# Patient Record
Sex: FEMALE | Race: WHITE | URBAN - METROPOLITAN AREA
[De-identification: names, ages, dates, MRNs, and addresses within clinical notes are randomized per-mention and may not be internally consistent; named-entity substitution may affect disease eponyms.]

---

## 2017-01-17 ENCOUNTER — OFFICE VISIT (OUTPATIENT)
Dept: FAMILY MEDICINE CLINIC | Facility: CLINIC | Age: 52
End: 2017-01-17

## 2017-01-17 VITALS
TEMPERATURE: 97.6 F | HEART RATE: 80 BPM | SYSTOLIC BLOOD PRESSURE: 140 MMHG | RESPIRATION RATE: 16 BRPM | WEIGHT: 252 LBS | DIASTOLIC BLOOD PRESSURE: 88 MMHG | BODY MASS INDEX: 38.19 KG/M2 | OXYGEN SATURATION: 97 % | HEIGHT: 68 IN

## 2017-01-17 DIAGNOSIS — J01.01 ACUTE RECURRENT MAXILLARY SINUSITIS: Primary | ICD-10-CM

## 2017-01-17 DIAGNOSIS — J45.41 MODERATE PERSISTENT ASTHMA WITH ACUTE EXACERBATION: ICD-10-CM

## 2017-01-17 DIAGNOSIS — J30.9 CHRONIC ALLERGIC RHINITIS: ICD-10-CM

## 2017-01-17 RX ORDER — CEFUROXIME AXETIL 500 MG/1
500 TABLET ORAL 2 TIMES DAILY
Qty: 20 TAB | Refills: 0 | Status: SHIPPED | OUTPATIENT
Start: 2017-01-17 | End: 2017-04-10

## 2017-01-17 RX ORDER — HYDROCODONE POLISTIREX AND CHLORPHENIRAMINE POLISTIREX 10; 8 MG/5ML; MG/5ML
1 SUSPENSION, EXTENDED RELEASE ORAL
Qty: 115 ML | Refills: 0 | Status: SHIPPED | OUTPATIENT
Start: 2017-01-17 | End: 2017-04-10 | Stop reason: SDUPTHER

## 2017-01-17 RX ORDER — DEXAMETHASONE 1.5 MG/1
TABLET ORAL
Qty: 12 TAB | Refills: 0 | Status: SHIPPED | OUTPATIENT
Start: 2017-01-17 | End: 2017-04-10 | Stop reason: ALTCHOICE

## 2017-01-17 RX ORDER — FLUTICASONE PROPIONATE 50 MCG
2 SPRAY, SUSPENSION (ML) NASAL DAILY
Qty: 3 BOTTLE | Refills: 3 | Status: SHIPPED | OUTPATIENT
Start: 2017-01-17 | End: 2018-11-13 | Stop reason: SDUPTHER

## 2017-01-17 NOTE — PROGRESS NOTES
Opal Fried is a 49 y.o.  female presents today for same day visit for asthma. Pt is in Room # 5.      1. Have you been to the ER, urgent care clinic since your last visit? Hospitalized since your last visit? No    2. Have you seen or consulted any other health care providers outside of the [address] [place] [address] since your last visit? Include any pap smears or colon screening.  No

## 2017-01-17 NOTE — PROGRESS NOTES
SUBJECTIVE  Chief Complaint   Patient presents with    Asthma     HPI:     She has had bad cough with sinus congestion and pain on Rt side of face since 1 weeks ago, not getting better. She has scantily productive cough and dyspnea/ wheeze. She is on Advair and Singulair. Chest is getting sore from coughing. She has chronic rhinitis along with asthma; but run out of Flonase couple of weeks ago. Her BP is being treated. Past Medical History   Diagnosis Date    Asthma     Cerebral palsy (HCC)     Cervical strain     GERD (gastroesophageal reflux disease)     Hyperlipidemia     Hypertension     Ovarian cyst     Sinusitis      with cough     Past Surgical History   Procedure Laterality Date    Hx tubal ligation Bilateral     Hx hysterectomy      Hx cholecystectomy          Current Outpatient Prescriptions   Medication Sig    chlorpheniramine-HYDROcodone (TUSSIONEX) 10-8 mg/5 mL suspension Take 5 mL by mouth every twelve (12) hours as needed for Cough. Max Daily Amount: 10 mL.  triamterene-hydroCHLOROthiazide (DYAZIDE) 37.5-25 mg per capsule TAKE ONE CAPSULE BY MOUTH ONCE DAILY    albuterol (PROVENTIL HFA, VENTOLIN HFA, PROAIR HFA) 90 mcg/actuation inhaler Take 2 Puffs by inhalation every four (4) hours as needed for Wheezing.  EPINEPHrine (EPIPEN) 0.3 mg/0.3 mL injection 0.3 mL by IntraMUSCular route once as needed for up to 1 dose.  naproxen (NAPROSYN) 500 mg tablet Take 1 Tab by mouth two (2) times daily as needed.  fluticasone-salmeterol (ADVAIR DISKUS) 250-50 mcg/dose diskus inhaler Take 1 Puff by inhalation every twelve (12) hours.  montelukast (SINGULAIR) 10 mg tablet Take 1 Tab by mouth daily.  fluticasone (FLONASE) 50 mcg/actuation nasal spray 2 Sprays by Both Nostrils route daily.  guaiFENesin (MUCINEX) 1,200 mg Ta12 ER tablet Take 1 Tab by mouth two (2) times a day.  (Patient taking differently: Take 1,200 mg by mouth daily as needed.)    CETIRIZINE HCL (ZYRTEC PO) Take  by mouth. No current facility-administered medications for this visit. Allergies   Allergen Reactions    Latex Hives    Other Plant, Animal, Environmental Anaphylaxis     Wasp Sting    Prednisone Unable to Obtain     ROS:   Constitutional: No fever, chills, night sweats, dizziness. Ear/Nose/Throat: No new speaking or hearing problems, nose bleed. Cardiovascular: No angina, palpitations, PND, orthopnea, lightheadedness, edema, claudication. Respiratory: No pleurisy, hemoptysis, unusual sputum. Gastrointestinal: No nausea, bowel habit change, pain, TRINO symptoms, melena, hematochezia, anorexia. Psychiatric: No agitation, confusion/disorientation, suicidal or homicidal ideation. Musculoskeletal: no significant joint swelling, instability, focal weakness. OBJECTIVE  Constitutional: General Appearance:  well developed, obese, nontoxic, in no acute distress. Visit Vitals    /88 (BP 1 Location: Left arm, BP Patient Position: Sitting)    Pulse 80    Temp 97.6 °F (36.4 °C) (Oral)    Resp 16    Ht 5' 8\" (1.727 m)    Wt 252 lb (114.3 kg)    SpO2 97%    BMI 38.32 kg/m2     Otoscopic Examination: external auditory canals are clear; tympanic membranes are dull. Nasal Cavity: mildly swollen mucosa & turbinates. Maxilla and Rt side of face and neck is tender w/o redness or heat. Throat: clear tonsils, oropharynx, posterior pharynx. Cardiovascular[de-identified] Palpation: PMI not displaced or enlarged, no thrills or heaves. Auscultation: RRR; no murmur, rubs or gallops. Extremities: no edema, no active varicosity. Pulmonary[de-identified] Respiratory effort: no dyspnea, no retractions, no accessory muscle use. Auscultation: normal & symmetrical air exchange; no rales; diffuse mild rhonchi and wheeze; no rubs  Psychiatric: Oriented to time, place and person. Musculoskeletal: NC/AT. Neck-supple. CXR (5/9/16): Negative. ASSESSMENT    1. Acute recurrent maxillary sinusitis    2.  Moderate persistent asthma with acute exacerbation    3. Chronic allergic rhinitis        PLAN    Orders Placed This Encounter    cefUROXime (CEFTIN) 500 mg tablet    dexamethasone (DECADRON) 1.5 mg tablet    fluticasone (FLONASE) 50 mcg/actuation nasal spray    chlorpheniramine-HYDROcodone (TUSSIONEX) 10-8 mg/5 mL suspension     Pharmacologic Management: Medications reviewed with the patient. Ceftin 500 bid. Decadron 6 mg x 2d, 3 mg x 2d, 1.5 mg x4 days. Back on Flonase. PRN Tussionex prn. Non-Pharmacologic Management: Discussed risk/benefit & side effect of treatment. Discussed DDx, follow-up & work-up. Follow up for regular OV in 2 weeks, prn sooner. PRN to ER. Asthma/ allergy reviewed again. Health risk from non adherence discussed. Patent voiced understanding.                           Nahomy Farnsworth MD

## 2017-04-10 ENCOUNTER — TELEPHONE (OUTPATIENT)
Dept: FAMILY MEDICINE CLINIC | Facility: CLINIC | Age: 52
End: 2017-04-10

## 2017-04-10 ENCOUNTER — OFFICE VISIT (OUTPATIENT)
Dept: FAMILY MEDICINE CLINIC | Age: 52
End: 2017-04-10

## 2017-04-10 VITALS
HEIGHT: 67 IN | HEART RATE: 76 BPM | BODY MASS INDEX: 41.59 KG/M2 | SYSTOLIC BLOOD PRESSURE: 126 MMHG | RESPIRATION RATE: 20 BRPM | OXYGEN SATURATION: 96 % | DIASTOLIC BLOOD PRESSURE: 84 MMHG | WEIGHT: 265 LBS | TEMPERATURE: 97.5 F

## 2017-04-10 DIAGNOSIS — R05.9 COUGH: ICD-10-CM

## 2017-04-10 DIAGNOSIS — J45.41 MODERATE PERSISTENT ASTHMA WITH ACUTE EXACERBATION: Primary | ICD-10-CM

## 2017-04-10 RX ORDER — HYDROCODONE POLISTIREX AND CHLORPHENIRAMINE POLISTIREX 10; 8 MG/5ML; MG/5ML
1 SUSPENSION, EXTENDED RELEASE ORAL
Qty: 115 ML | Refills: 0 | Status: SHIPPED | OUTPATIENT
Start: 2017-04-10 | End: 2017-06-15 | Stop reason: SDUPTHER

## 2017-04-10 RX ORDER — GLUCOSAM/CHONDRO/HERB 149/HYAL 750-100 MG
TABLET ORAL
COMMUNITY

## 2017-04-10 RX ORDER — BISMUTH SUBSALICYLATE 262 MG
1 TABLET,CHEWABLE ORAL DAILY
COMMUNITY

## 2017-04-10 RX ORDER — ALBUTEROL SULFATE 90 UG/1
2 AEROSOL, METERED RESPIRATORY (INHALATION)
Qty: 1 INHALER | Refills: 0 | Status: SHIPPED | OUTPATIENT
Start: 2017-04-10 | End: 2017-08-11 | Stop reason: SDUPTHER

## 2017-04-10 NOTE — TELEPHONE ENCOUNTER
Received a call from the pt c/o cough and wheezing. Pt is advised to make a follow up appt. Pt acknowledges understanding. If symptoms worsens to go to ED for further evaluation.

## 2017-04-10 NOTE — PROGRESS NOTES
HISTORY OF PRESENT ILLNESS  Maya Solis is a 49 y.o. female. HPI Comments: Pt with significant asthma history presents with cough with post-tussive vomiting that started last night. Today the cough is accompanied by \"gasping for breath,\" and is productive of thick mucus. She denies any fever, but states she could have walking pneumonia and not have a fever. She has been having increased allergy/asthma symptoms for the past two weeks d/t the pollen being high. States she is taking her singulair, zyrtec (at night), advair (twice daily), and her albuterol inhaler (5 times this week). Cough   Associated symptoms include shortness of breath. Pertinent negatives include no chest pain, no abdominal pain and no headaches. Review of Systems   Constitutional: Positive for malaise/fatigue. Negative for chills and fever. HENT: Positive for congestion and sore throat (d/t cough). Negative for ear pain and tinnitus. Eyes: Negative for blurred vision and double vision. Respiratory: Positive for cough, sputum production, shortness of breath and wheezing. Cardiovascular: Negative for chest pain and palpitations. Gastrointestinal: Positive for vomiting (post-tussive). Negative for abdominal pain, constipation, diarrhea and nausea. Genitourinary: Negative for dysuria. Musculoskeletal: Positive for myalgias (musculoskeletal rib pain secondary to cough). Skin: Negative for rash. Neurological: Positive for dizziness (with cough episodes). Negative for tingling and headaches. Visit Vitals    /84 (BP 1 Location: Right arm, BP Patient Position: Sitting)    Pulse 76    Temp 97.5 °F (36.4 °C) (Oral)    Resp 20    Ht 5' 7\" (1.702 m)    Wt 265 lb (120.2 kg)    SpO2 96%    BMI 41.5 kg/m2       Physical Exam   Constitutional: She is oriented to person, place, and time. Vital signs are normal. She appears well-developed and well-nourished. She is cooperative. She does not appear ill.  No distress. HENT:   Head: Normocephalic and atraumatic. Right Ear: Tympanic membrane, external ear and ear canal normal.   Left Ear: Tympanic membrane, external ear and ear canal normal.   Nose: Mucosal edema (mild) and rhinorrhea (scant) present. Mouth/Throat: Uvula is midline, oropharynx is clear and moist and mucous membranes are normal.   Eyes: Conjunctivae are normal.   Neck: Neck supple. Cardiovascular: Normal rate, regular rhythm and normal heart sounds. Exam reveals no gallop and no friction rub. No murmur heard. Pulses:       Radial pulses are 2+ on the right side, and 2+ on the left side. Pulmonary/Chest: Effort normal and breath sounds normal. She has no decreased breath sounds. She has no wheezes. She has no rhonchi. She has no rales. There is a slight inspiratory and expiratory rub appreciated at the left upper lung field that seems to improve after one episode of coughing (pt coughs throughout the exam); but no elizabeth W/R/R in any of the lung fields. Lymphadenopathy:        Head (right side): No submental, no submandibular, no tonsillar, no preauricular, no posterior auricular and no occipital adenopathy present. Head (left side): No submental, no submandibular, no tonsillar, no preauricular, no posterior auricular and no occipital adenopathy present. She has no cervical adenopathy. Neurological: She is alert and oriented to person, place, and time. Skin: Skin is warm and dry. Psychiatric: She has a normal mood and affect. Her speech is normal and behavior is normal. Thought content normal.   Nursing note and vitals reviewed. ASSESSMENT and PLAN  Exam is reassuring. History is strongly suggestive of allergic/asthmatic etiology. ICD-10-CM ICD-9-CM    1. Moderate persistent asthma with acute exacerbation J45.41 493.92 albuterol (PROVENTIL HFA, VENTOLIN HFA, PROAIR HFA) 90 mcg/actuation inhaler   2.  Cough R05 786.2 chlorpheniramine-HYDROcodone (TUSSIONEX) 10-8 mg/5 mL suspension     1. Provided after-visit information on  Asthma attack. Reviewed reasons to return or seek emergency care. Provided additional information from Dr Rosemarie Golden and the [facility] about ways to address the root causes of asthma/inflammation. Refilled albuterol at pt's request. Discussed refill of pt's dexamethasone prescription; she prefers not to take any more steriods, and indicates that she can follow up with her PCP in a couple of days if needed. 2. Provided after-visit information on  Cough. Reviewed reasons to return or seek emergency care.  reviewed. No aberrant behavior noted. Pt verbalized understanding and agreement with the plan of care.     Nithya Ramirez PA-C

## 2017-04-10 NOTE — PATIENT INSTRUCTIONS
The Right Probiotics May Help Your Hay Fever  Good bugs may help to reduce seasonal-allergy symptoms, suggests a small clinical study. Were not talking about ants or crickets outside but about beneficial bacteria living inside your digestive system. Researchers divided a group of allergy sufferers, and half took a probiotic supplement of lactobacilli and bifidobacteria (brand name Kyo-Tamelas) while the other half took a placebo. Lavinia and behold, those who took the probiotic supplement during hay fever season reported having fewer symptoms. The digestive tract houses part of the immune system, and researchers suspect that a surge in those particular bacteria increase the bodys percentage of regulatory T-cells, which helps to keep symptoms in check. (The study focused on people with mild symptoms only.)     If allergies tend to sideline you this time of year, talk with your physician about trying probiotics. You may get to enjoy that spring fever after all. The Wellness Tip of the Week is from [facility] 360-5 Daily Wellness Tips. This study showed improvement in allergy symptoms with probiotics:  http://ajcn. nutrition. org/content/early/2017/02/22/ajcn. 170.067682    http://[employer]/blog/2013/09/17/breathe-easy-addressing-root-causes-asthma/      Inflammation is a natural part of the body's defenses and healing process. But excessive or chronic inflammation can lead to or worsen diseases such as heart disease, obesity, arthritis, and diabetes. Proper diet, exercise, sleep, and stress reduction are all important ways to keep inflammation in check.      Here are some great online articles for more information:  http://PeerApp/grow/how-inflammation-affects-health-body  http://[employer].netomat/13-anti-inflammatory-foods/  http://PeerApp/fitness/exercise-inflammation-060513    http://[employer]/blog/2012/01/27/inflammation-how-to-cool-the-fire-inside-you-thats-making-you-fat-and-diseased/         Asthma Attack: Care Instructions  Your Care Instructions    During an asthma attack, the airways swell and narrow. This makes it hard to breathe. Severe asthma attacks can be life-threatening, but you can help prevent them by keeping your asthma under control and treating symptoms before they get bad. Symptoms include being short of breath, having chest tightness, coughing, and wheezing. Noting and treating these symptoms can also help you avoid future trips to the emergency room. The doctor has checked you carefully, but problems can develop later. If you notice any problems or new symptoms, get medical treatment right away. Follow-up care is a key part of your treatment and safety. Be sure to make and go to all appointments, and call your doctor if you are having problems. It's also a good idea to know your test results and keep a list of the medicines you take. How can you care for yourself at home? · Follow your asthma action plan to prevent and treat attacks. If you don't have an asthma action plan, work with your doctor to create one. · Take your asthma medicines exactly as prescribed. Talk to your doctor right away if you have any questions about how to take them. ¨ Use your quick-relief medicine when you have symptoms of an attack. Quick-relief medicine is usually an albuterol inhaler. Some people need to use quick-relief medicine before they exercise. ¨ Take your controller medicine every day, not just when you have symptoms. Controller medicine is usually an inhaled corticosteroid. The goal is to prevent problems before they occur.  Don't use your controller medicine to treat an attack that has already started. It doesn't work fast enough to help. ¨ If your doctor prescribed corticosteroid pills to use during an attack, take them exactly as prescribed. It may take hours for the pills to work, but they may make the episode shorter and help you breathe better. ¨ Keep your quick-relief medicine with you at all times. · Talk to your doctor before using other medicines. Some medicines, such as aspirin, can cause asthma attacks in some people. · If you have a peak flow meter, use it to check how well you are breathing. This can help you predict when an asthma attack is going to occur. Then you can take medicine to prevent the asthma attack or make it less severe. · Do not smoke or allow others to smoke around you. Avoid smoky places. Smoking makes asthma worse. If you need help quitting, talk to your doctor about stop-smoking programs and medicines. These can increase your chances of quitting for good. · Learn what triggers an asthma attack for you, and avoid the triggers when you can. Common triggers include colds, smoke, air pollution, dust, pollen, mold, pets, cockroaches, stress, and cold air. · Avoid colds and the flu. Get a pneumococcal vaccine shot. If you have had one before, ask your doctor if you need a second dose. Get a flu vaccine every fall. If you must be around people with colds or the flu, wash your hands often. When should you call for help? Call 911 anytime you think you may need emergency care. For example, call if:  · You have severe trouble breathing. Call your doctor now or seek immediate medical care if:  · Your symptoms do not get better after you have followed your asthma action plan. · You have new or worse trouble breathing. · Your coughing and wheezing get worse. · You cough up dark brown or bloody mucus (sputum). · You have a new or higher fever.   Watch closely for changes in your health, and be sure to contact your doctor if:  · You need to use quick-relief medicine on more than 2 days a week (unless it is just for exercise). · You cough more deeply or more often, especially if you notice more mucus or a change in the color of your mucus. · You are not getting better as expected. Where can you learn more? Go to http://tavon-kareen.info/. Enter M578 in the search box to learn more about \"Asthma Attack: Care Instructions. \"  Current as of: May 23, 2016  Content Version: 11.2  © 4189-6715 [employer]. Care instructions adapted under license by [employer] (which disclaims liability or warranty for this information). If you have questions about a medical condition or this instruction, always ask your healthcare professional. [address] any warranty or liability for your use of this information. Cough: Care Instructions  Your Care Instructions  A cough is your body's response to something that bothers your throat or airways. Many things can cause a cough. You might cough because of a cold or the flu, bronchitis, or asthma. Smoking, postnasal drip, allergies, and stomach acid that backs up into your throat also can cause coughs. A cough is a symptom, not a disease. Most coughs stop when the cause, such as a cold, goes away. You can take a few steps at home to cough less and feel better. Follow-up care is a key part of your treatment and safety. Be sure to make and go to all appointments, and call your doctor if you are having problems. It's also a good idea to know your test results and keep a list of the medicines you take. How can you care for yourself at home? · Drink lots of water and other fluids. This helps thin the mucus and soothes a dry or sore throat. Honey or lemon juice in hot water or tea may ease a dry cough. · Take cough medicine as directed by your doctor. · Prop up your head on pillows to help you breathe and ease a dry cough. · Try cough drops to soothe a dry or sore throat. Cough drops don't stop a cough. Medicine-flavored cough drops are no better than candy-flavored drops or hard candy. · Do not smoke. Avoid secondhand smoke. If you need help quitting, talk to your doctor about stop-smoking programs and medicines. These can increase your chances of quitting for good. When should you call for help? Call 911 anytime you think you may need emergency care. For example, call if:  · You have severe trouble breathing. Call your doctor now or seek immediate medical care if:  · You cough up blood. · You have new or worse trouble breathing. · You have a new or higher fever. · You have a new rash. Watch closely for changes in your health, and be sure to contact your doctor if:  · You cough more deeply or more often, especially if you notice more mucus or a change in the color of your mucus. · You have new symptoms, such as a sore throat, an earache, or sinus pain. · You do not get better as expected. Where can you learn more? Go to http://tavon-kareen.info/. Enter D279 in the search box to learn more about \"Cough: Care Instructions. \"  Current as of: May 27, 2016  Content Version: 11.2  © 0693-5857 [employer]. Care instructions adapted under license by [employer] (which disclaims liability or warranty for this information). If you have questions about a medical condition or this instruction, always ask your healthcare professional. [place] [address] any warranty or liability for your use of this information.

## 2017-04-10 NOTE — MR AVS SNAPSHOT
Visit Information Date & Time Provider Department Dept. Phone Encounter #  
 4/10/2017 10:30 AM [facility] [phone] [id number] Upcoming Health Maintenance Date Due Pneumococcal 19-64 Medium Risk (1 of 1 - PPSV23) 9/1/1985 DTaP/Tdap/Td series (1 - Tdap) 9/1/1987 FOBT Q 1 YEAR AGE 50-75 9/1/2016 BREAST CANCER SCRN MAMMOGRAM 5/2/2018 PAP AKA CERVICAL CYTOLOGY 10/12/2019 Allergies as of 4/10/2017  Review Complete On: 4/10/2017 By: Kris Molina PA-C Severity Noted Reaction Type Reactions Latex  02/16/2015    Hives Other Plant, Animal, Environmental High 01/26/2016    Anaphylaxis Wasp Sting Prednisone    Unable to Obtain Current Immunizations  Never Reviewed No immunizations on file. Not reviewed this visit You Were Diagnosed With   
  
 Codes Comments Moderate persistent asthma with acute exacerbation    -  Primary ICD-10-CM: J45.41 
ICD-9-CM: 493.92 Cough     ICD-10-CM: R05 ICD-9-CM: 369. 2 Vitals BP Pulse Temp Resp Height(growth percentile) Weight(growth percentile) (!) 148/94 (BP 1 Location: Left arm, BP Patient Position: Sitting) 76 97.5 °F (36.4 °C) (Oral) 20 5' 7\" (1.702 m) 265 lb (120.2 kg) SpO2 BMI OB Status Smoking Status 96% 41.5 kg/m2 Hysterectomy Never Smoker Vitals History BMI and BSA Data Body Mass Index Body Surface Area 41.5 kg/m 2 2.38 m 2 Preferred Pharmacy Pharmacy Name Phone [facility] [address] [place] [address], [address] [place] [address] Benitez Fuentes 71 Your Updated Medication List  
  
   
This list is accurate as of: 4/10/17 11:08 AM.  Always use your most recent med list.  
  
  
  
  
 albuterol 90 mcg/actuation inhaler Commonly known as:  PROVENTIL HFA, VENTOLIN HFA, PROAIR HFA Take 2 Puffs by inhalation every four (4) hours as needed for Wheezing. chlorpheniramine-HYDROcodone 10-8 mg/5 mL suspension Commonly known as:  Clifm Chasten Take 5 mL by mouth every twelve (12) hours as needed for Cough. Max Daily Amount: 10 mL. EPINEPHrine 0.3 mg/0.3 mL injection Commonly known as:  EPIPEN  
0.3 mL by IntraMUSCular route once as needed for up to 1 dose. fluticasone 50 mcg/actuation nasal spray Commonly known as:  Virden Longest 2 Sprays by Both Nostrils route daily. fluticasone-salmeterol 250-50 mcg/dose diskus inhaler Commonly known as:  ADVAIR DISKUS Take 1 Puff by inhalation every twelve (12) hours. guaiFENesin 1,200 mg Ta12 ER tablet Commonly known as:  Mando & Mando Take 1 Tab by mouth two (2) times a day. montelukast 10 mg tablet Commonly known as:  SINGULAIR Take 1 Tab by mouth daily. multivitamin tablet Commonly known as:  ONE A DAY Take 1 Tab by mouth daily. naproxen 500 mg tablet Commonly known as:  NAPROSYN Take 1 Tab by mouth two (2) times daily as needed. Omega-3-DHA-EPA-Fish Oil 1,000 mg (120 mg-180 mg) Cap Take  by mouth.  
  
 triamterene-hydroCHLOROthiazide 37.5-25 mg per capsule Commonly known as:  Roberto Carlos Frieda TAKE ONE CAPSULE BY MOUTH ONCE DAILY  
  
 ZYRTEC PO Take  by mouth. Prescriptions Printed Refills  
 chlorpheniramine-HYDROcodone (TUSSIONEX) 10-8 mg/5 mL suspension 0 Sig: Take 5 mL by mouth every twelve (12) hours as needed for Cough. Max Daily Amount: 10 mL. Class: Print Route: Oral  
  
Prescriptions Sent to Pharmacy Refills  
 albuterol (PROVENTIL HFA, VENTOLIN HFA, PROAIR HFA) 90 mcg/actuation inhaler 0 Sig: Take 2 Puffs by inhalation every four (4) hours as needed for Wheezing. Class: Normal  
 Pharmacy: [id number] [place] [address], [facility] [address] Ph #: [phone] Route: Inhalation Patient Instructions The Right Probiotics May Help Your Hay Fever Good bugs may help to reduce seasonal-allergy symptoms, suggests a small clinical study. Were not talking about ants or crickets outside but about beneficial bacteria living inside your digestive system. Researchers divided a group of allergy sufferers, and half took a probiotic supplement of lactobacilli and bifidobacteria (brand name Alex) while the other half took a placebo. Lavinia and behold, those who took the probiotic supplement during hay fever season reported having fewer symptoms. The digestive tract houses part of the immune system, and researchers suspect that a surge in those particular bacteria increase the bodys percentage of regulatory T-cells, which helps to keep symptoms in check. (The study focused on people with mild symptoms only.) If allergies tend to sideline you this time of year, talk with your physician about trying probiotics. You may get to enjoy that spring fever after all. The Wellness Tip of the Week is from [facility] 360-5 Daily Wellness Tips. This study showed improvement in allergy symptoms with probiotics: 
http://ajcn. nutrition. org/content/early/2017/02/22/ajcn. 304.499107 
 
http://Planwise/blog/2013/09/17/breathe-easy-addressing-root-causes-asthma/ 
 
 
Inflammation is a natural part of the body's defenses and healing process. But excessive or chronic inflammation can lead to or worsen diseases such as heart disease, obesity, arthritis, and diabetes. Proper diet, exercise, sleep, and stress reduction are all important ways to keep inflammation in check. Here are some great online articles for more information: 
http://[employer].QuantiaMD/grow/how-inflammation-affects-health-body 
http://Fashion Project.com/13-anti-inflammatory-foods/ 
http://Visualead/fitness/exercise-inflammation-060513 
 
http://Planwise/blog/2012/01/27/inflammation-how-to-cool-the-fire-inside-you-thats-making-you-fat-and-diseased/ 
 
 
  
Asthma Attack: Care Instructions Your Care Instructions During an asthma attack, the airways swell and narrow. This makes it hard to breathe. Severe asthma attacks can be life-threatening, but you can help prevent them by keeping your asthma under control and treating symptoms before they get bad. Symptoms include being short of breath, having chest tightness, coughing, and wheezing. Noting and treating these symptoms can also help you avoid future trips to the emergency room. The doctor has checked you carefully, but problems can develop later. If you notice any problems or new symptoms, get medical treatment right away. Follow-up care is a key part of your treatment and safety. Be sure to make and go to all appointments, and call your doctor if you are having problems. It's also a good idea to know your test results and keep a list of the medicines you take. How can you care for yourself at home? · Follow your asthma action plan to prevent and treat attacks. If you don't have an asthma action plan, work with your doctor to create one. · Take your asthma medicines exactly as prescribed. Talk to your doctor right away if you have any questions about how to take them. ¨ Use your quick-relief medicine when you have symptoms of an attack. Quick-relief medicine is usually an albuterol inhaler. Some people need to use quick-relief medicine before they exercise. ¨ Take your controller medicine every day, not just when you have symptoms. Controller medicine is usually an inhaled corticosteroid. The goal is to prevent problems before they occur. Don't use your controller medicine to treat an attack that has already started. It doesn't work fast enough to help. ¨ If your doctor prescribed corticosteroid pills to use during an attack, take them exactly as prescribed. It may take hours for the pills to work, but they may make the episode shorter and help you breathe better. ¨ Keep your quick-relief medicine with you at all times. · Talk to your doctor before using other medicines. Some medicines, such as aspirin, can cause asthma attacks in some people. · If you have a peak flow meter, use it to check how well you are breathing. This can help you predict when an asthma attack is going to occur. Then you can take medicine to prevent the asthma attack or make it less severe. · Do not smoke or allow others to smoke around you. Avoid smoky places. Smoking makes asthma worse. If you need help quitting, talk to your doctor about stop-smoking programs and medicines. These can increase your chances of quitting for good. · Learn what triggers an asthma attack for you, and avoid the triggers when you can. Common triggers include colds, smoke, air pollution, dust, pollen, mold, pets, cockroaches, stress, and cold air. · Avoid colds and the flu. Get a pneumococcal vaccine shot. If you have had one before, ask your doctor if you need a second dose. Get a flu vaccine every fall. If you must be around people with colds or the flu, wash your hands often. When should you call for help? Call 911 anytime you think you may need emergency care. For example, call if: 
· You have severe trouble breathing. Call your doctor now or seek immediate medical care if: 
· Your symptoms do not get better after you have followed your asthma action plan. · You have new or worse trouble breathing. · Your coughing and wheezing get worse. · You cough up dark brown or bloody mucus (sputum). · You have a new or higher fever. Watch closely for changes in your health, and be sure to contact your doctor if: 
· You need to use quick-relief medicine on more than 2 days a week (unless it is just for exercise). · You cough more deeply or more often, especially if you notice more mucus or a change in the color of your mucus. · You are not getting better as expected. Where can you learn more? Go to http://tavon-kareen.info/. Enter A141 in the search box to learn more about \"Asthma Attack: Care Instructions. \" Current as of: May 23, 2016 Content Version: 11.2 © 0070-9551 [employer]. Care instructions adapted under license by FXTrip (which disclaims liability or warranty for this information). If you have questions about a medical condition or this instruction, always ask your healthcare professional. [address] any warranty or liability for your use of this information. Cough: Care Instructions Your Care Instructions A cough is your body's response to something that bothers your throat or airways. Many things can cause a cough. You might cough because of a cold or the flu, bronchitis, or asthma. Smoking, postnasal drip, allergies, and stomach acid that backs up into your throat also can cause coughs. A cough is a symptom, not a disease. Most coughs stop when the cause, such as a cold, goes away. You can take a few steps at home to cough less and feel better. Follow-up care is a key part of your treatment and safety. Be sure to make and go to all appointments, and call your doctor if you are having problems. It's also a good idea to know your test results and keep a list of the medicines you take. How can you care for yourself at home? · Drink lots of water and other fluids. This helps thin the mucus and soothes a dry or sore throat. Honey or lemon juice in hot water or tea may ease a dry cough. · Take cough medicine as directed by your doctor. · Prop up your head on pillows to help you breathe and ease a dry cough. · Try cough drops to soothe a dry or sore throat. Cough drops don't stop a cough. Medicine-flavored cough drops are no better than candy-flavored drops or hard candy. · Do not smoke. Avoid secondhand smoke. If you need help quitting, talk to your doctor about stop-smoking programs and medicines. These can increase your chances of quitting for good. When should you call for help? Call 911 anytime you think you may need emergency care. For example, call if: 
· You have severe trouble breathing. Call your doctor now or seek immediate medical care if: 
· You cough up blood. · You have new or worse trouble breathing. · You have a new or higher fever. · You have a new rash. Watch closely for changes in your health, and be sure to contact your doctor if: 
· You cough more deeply or more often, especially if you notice more mucus or a change in the color of your mucus. · You have new symptoms, such as a sore throat, an earache, or sinus pain. · You do not get better as expected. Where can you learn more? Go to http://tavon-kareen.info/. Enter D279 in the search box to learn more about \"Cough: Care Instructions. \" Current as of: May 27, 2016 Content Version: 11.2 © 9128-9556 [employer]. Care instructions adapted under license by [employer] (which disclaims liability or warranty for this information). If you have questions about a medical condition or this instruction, always ask your healthcare professional. [place] [address] any warranty or liability for your use of this information. Introducing [facility]! [address] introduces Spondo patient portal. Now you can access parts of your medical record, email your doctor's office, and request medication refills online. 1. In your internet browser, go to [email or web address] 2. Click on the First Time User? Click Here link in the Sign In box. You will see the New Member Sign Up page. 3. Enter your Spondo Access Code exactly as it appears below. You will not need to use this code after youve completed the sign-up process. If you do not sign up before the expiration date, you must request a new code. · Spondo Access Code: [email or web address] Expires: 7/9/2017 10:28 AM 
 
 4. Enter the last four digits of your Social Security Number (xxxx) and Date of Birth (mm/dd/yyyy) as indicated and click Submit. You will be taken to the next sign-up page. 5. Create a InThrMa ID. This will be your InThrMa login ID and cannot be changed, so think of one that is secure and easy to remember. 6. Create a InThrMa password. You can change your password at any time. 7. Enter your Password Reset Question and Answer. This can be used at a later time if you forget your password. 8. Enter your e-mail address. You will receive e-mail notification when new information is available in [address] 9. Click Sign Up. You can now view and download portions of your medical record. 10. Click the Download Summary menu link to download a portable copy of your medical information. If you have questions, please visit the Frequently Asked Questions section of the InThrMa website. Remember, InThrMa is NOT to be used for urgent needs. For medical emergencies, dial 911. Now available from your iPhone and Android! Please provide this summary of care documentation to your next provider. Your primary care clinician is listed as Abner Mckeon. If you have any questions after today's visit, please call [phone].

## 2017-04-20 RX ORDER — TRIAMTERENE AND HYDROCHLOROTHIAZIDE 37.5; 25 MG/1; MG/1
CAPSULE ORAL
Qty: 30 CAP | Refills: 0 | Status: SHIPPED | OUTPATIENT
Start: 2017-04-20 | End: 2017-04-28 | Stop reason: SDUPTHER

## 2017-04-28 ENCOUNTER — HOSPITAL ENCOUNTER (OUTPATIENT)
Dept: LAB | Age: 52
Discharge: HOME | End: 2017-04-28
Payer: COMMERCIAL

## 2017-04-28 ENCOUNTER — OFFICE VISIT (OUTPATIENT)
Dept: FAMILY MEDICINE CLINIC | Facility: CLINIC | Age: 52
End: 2017-04-28

## 2017-04-28 VITALS
DIASTOLIC BLOOD PRESSURE: 86 MMHG | HEIGHT: 67 IN | HEART RATE: 62 BPM | TEMPERATURE: 97.8 F | BODY MASS INDEX: 41.65 KG/M2 | OXYGEN SATURATION: 98 % | RESPIRATION RATE: 15 BRPM | WEIGHT: 265.4 LBS | SYSTOLIC BLOOD PRESSURE: 130 MMHG

## 2017-04-28 DIAGNOSIS — E78.5 HYPERLIPIDEMIA, UNSPECIFIED HYPERLIPIDEMIA TYPE: ICD-10-CM

## 2017-04-28 DIAGNOSIS — J30.9 CHRONIC ALLERGIC RHINITIS: ICD-10-CM

## 2017-04-28 DIAGNOSIS — Z12.12 ENCOUNTER FOR COLORECTAL CANCER SCREENING: ICD-10-CM

## 2017-04-28 DIAGNOSIS — I10 ESSENTIAL HYPERTENSION: Primary | ICD-10-CM

## 2017-04-28 DIAGNOSIS — E66.01 SEVERE OBESITY (BMI >= 40) (HCC): ICD-10-CM

## 2017-04-28 DIAGNOSIS — J45.40 MODERATE PERSISTENT ASTHMA WITHOUT COMPLICATION: ICD-10-CM

## 2017-04-28 DIAGNOSIS — Z12.11 ENCOUNTER FOR COLORECTAL CANCER SCREENING: ICD-10-CM

## 2017-04-28 DIAGNOSIS — I10 ESSENTIAL HYPERTENSION: ICD-10-CM

## 2017-04-28 LAB
ALBUMIN SERPL BCP-MCNC: 4 G/DL (ref 3.4–5)
ALBUMIN/GLOB SERPL: 1.2 {RATIO} (ref 0.8–1.7)
ALP SERPL-CCNC: 129 U/L (ref 45–117)
ALT SERPL-CCNC: 44 U/L (ref 13–56)
ANION GAP BLD CALC-SCNC: 9 MMOL/L (ref 3–18)
APPEARANCE UR: CLEAR
AST SERPL W P-5'-P-CCNC: 30 U/L (ref 15–37)
BILIRUB SERPL-MCNC: 0.4 MG/DL (ref 0.2–1)
BILIRUB UR QL: NEGATIVE
BUN SERPL-MCNC: 13 MG/DL (ref 7–18)
BUN/CREAT SERPL: 22 (ref 12–20)
CALCIUM SERPL-MCNC: 9.7 MG/DL (ref 8.5–10.1)
CHLORIDE SERPL-SCNC: 105 MMOL/L (ref 100–108)
CHOLEST SERPL-MCNC: 201 MG/DL
CO2 SERPL-SCNC: 26 MMOL/L (ref 21–32)
COLOR UR: YELLOW
CREAT SERPL-MCNC: 0.6 MG/DL (ref 0.6–1.3)
ERYTHROCYTE [DISTWIDTH] IN BLOOD BY AUTOMATED COUNT: 13.4 % (ref 11.6–14.5)
EST. AVERAGE GLUCOSE BLD GHB EST-MCNC: 105 MG/DL
GLOBULIN SER CALC-MCNC: 3.3 G/DL (ref 2–4)
GLUCOSE SERPL-MCNC: 85 MG/DL (ref 74–99)
GLUCOSE UR STRIP.AUTO-MCNC: NEGATIVE MG/DL
HBA1C MFR BLD: 5.3 % (ref 4.2–5.6)
HCT VFR BLD AUTO: 44.3 % (ref 35–45)
HDLC SERPL-MCNC: 55 MG/DL (ref 40–60)
HDLC SERPL: 3.7 {RATIO} (ref 0–5)
HGB BLD-MCNC: 14.3 G/DL (ref 12–16)
HGB UR QL STRIP: NEGATIVE
KETONES UR QL STRIP.AUTO: NEGATIVE MG/DL
LDLC SERPL CALC-MCNC: 124.2 MG/DL (ref 0–100)
LEUKOCYTE ESTERASE UR QL STRIP.AUTO: NEGATIVE
LIPID PROFILE,FLP: ABNORMAL
MCH RBC QN AUTO: 31.2 PG (ref 24–34)
MCHC RBC AUTO-ENTMCNC: 32.3 G/DL (ref 31–37)
MCV RBC AUTO: 96.7 FL (ref 74–97)
NITRITE UR QL STRIP.AUTO: NEGATIVE
PH UR STRIP: 7.5 [PH] (ref 5–8)
PLATELET # BLD AUTO: 248 K/UL (ref 135–420)
PMV BLD AUTO: 12 FL (ref 9.2–11.8)
POTASSIUM SERPL-SCNC: 3.6 MMOL/L (ref 3.5–5.5)
PROT SERPL-MCNC: 7.3 G/DL (ref 6.4–8.2)
PROT UR STRIP-MCNC: NEGATIVE MG/DL
RBC # BLD AUTO: 4.58 M/UL (ref 4.2–5.3)
SODIUM SERPL-SCNC: 140 MMOL/L (ref 136–145)
SP GR UR REFRACTOMETRY: 1.01 (ref 1–1.03)
TRIGL SERPL-MCNC: 109 MG/DL (ref ?–150)
TSH SERPL DL<=0.05 MIU/L-ACNC: 2.5 UIU/ML (ref 0.36–3.74)
URATE SERPL-MCNC: 6.4 MG/DL (ref 2.6–7.2)
UROBILINOGEN UR QL STRIP.AUTO: 0.2 EU/DL (ref 0.2–1)
VLDLC SERPL CALC-MCNC: 21.8 MG/DL
WBC # BLD AUTO: 5.1 K/UL (ref 4.6–13.2)

## 2017-04-28 PROCEDURE — 83036 HEMOGLOBIN GLYCOSYLATED A1C: CPT | Performed by: FAMILY MEDICINE

## 2017-04-28 PROCEDURE — 85027 COMPLETE CBC AUTOMATED: CPT | Performed by: FAMILY MEDICINE

## 2017-04-28 PROCEDURE — 84550 ASSAY OF BLOOD/URIC ACID: CPT | Performed by: FAMILY MEDICINE

## 2017-04-28 PROCEDURE — 80061 LIPID PANEL: CPT | Performed by: FAMILY MEDICINE

## 2017-04-28 PROCEDURE — 84443 ASSAY THYROID STIM HORMONE: CPT | Performed by: FAMILY MEDICINE

## 2017-04-28 PROCEDURE — 80053 COMPREHEN METABOLIC PANEL: CPT | Performed by: FAMILY MEDICINE

## 2017-04-28 PROCEDURE — 81003 URINALYSIS AUTO W/O SCOPE: CPT | Performed by: FAMILY MEDICINE

## 2017-04-28 PROCEDURE — 36415 COLL VENOUS BLD VENIPUNCTURE: CPT | Performed by: FAMILY MEDICINE

## 2017-04-28 RX ORDER — TRIAMTERENE AND HYDROCHLOROTHIAZIDE 37.5; 25 MG/1; MG/1
CAPSULE ORAL
Qty: 90 CAP | Refills: 3 | Status: SHIPPED | OUTPATIENT
Start: 2017-04-28 | End: 2017-09-11 | Stop reason: SDUPTHER

## 2017-04-28 RX ORDER — TRIAMTERENE AND HYDROCHLOROTHIAZIDE 37.5; 25 MG/1; MG/1
CAPSULE ORAL
Qty: 30 CAP | Refills: 0 | Status: SHIPPED | OUTPATIENT
Start: 2017-04-28 | End: 2017-04-28 | Stop reason: SDUPTHER

## 2017-04-28 NOTE — PROGRESS NOTES
SUBJECTIVE  Chief Complaint   Patient presents with    Asthma    Hypertension    Cholesterol Problem     HPI:   Her asthma is now controlled. Her coughs and wheezes at night is better. She is on Singulair. Her sinus is better. She is doing well as long as not exposed to fumes and dust.    Her BP is controlled. She is not dieting for lipids or obesity. Her back pain is stable  She takes naproxen about tiw, only on days she has to lift a lot at work. She went to an  10 days ago for foot swelling. It has resolved. Xray was negative. She was given antibiotics. She was get records. Past Medical History:   Diagnosis Date    Asthma     Cerebral palsy (HCC)     Cervical strain     GERD (gastroesophageal reflux disease)     Hyperlipidemia     Hypertension     Ovarian cyst     Sinusitis     with cough     Past Surgical History:   Procedure Laterality Date    HX CHOLECYSTECTOMY      HX HYSTERECTOMY      HX TUBAL LIGATION Bilateral         Current Outpatient Prescriptions   Medication Sig    triamterene-hydroCHLOROthiazide (DYAZIDE) 37.5-25 mg per capsule TAKE ONE CAPSULE BY MOUTH ONCE DAILY    multivitamin (ONE A DAY) tablet Take 1 Tab by mouth daily.  Omega-3-DHA-EPA-Fish Oil 1,000 mg (120 mg-180 mg) cap Take  by mouth.  chlorpheniramine-HYDROcodone (TUSSIONEX) 10-8 mg/5 mL suspension Take 5 mL by mouth every twelve (12) hours as needed for Cough. Max Daily Amount: 10 mL.  albuterol (PROVENTIL HFA, VENTOLIN HFA, PROAIR HFA) 90 mcg/actuation inhaler Take 2 Puffs by inhalation every four (4) hours as needed for Wheezing.  naproxen (NAPROSYN) 500 mg tablet Take 1 Tab by mouth two (2) times daily as needed.  fluticasone (FLONASE) 50 mcg/actuation nasal spray 2 Sprays by Both Nostrils route daily.  EPINEPHrine (EPIPEN) 0.3 mg/0.3 mL injection 0.3 mL by IntraMUSCular route once as needed for up to 1 dose.     fluticasone-salmeterol (ADVAIR DISKUS) 250-50 mcg/dose diskus inhaler Take 1 Puff by inhalation every twelve (12) hours.  montelukast (SINGULAIR) 10 mg tablet Take 1 Tab by mouth daily.  guaiFENesin (MUCINEX) 1,200 mg Ta12 ER tablet Take 1 Tab by mouth two (2) times a day. (Patient taking differently: Take 1,200 mg by mouth daily as needed.)    CETIRIZINE HCL (ZYRTEC PO) Take  by mouth. No current facility-administered medications for this visit. Allergies   Allergen Reactions    Latex Hives    Other Plant, Animal, Environmental Anaphylaxis     Wasp Sting    Prednisone Unable to Obtain     ROS:   Constitutional: No fever, chills, night sweats, dizziness. Ear/Nose/Throat: No ear/ throat/ sinus pain, lesions, unusual discharge, new speaking or hearing problems, nose bleed. Cardiovascular: No angina, palpitations, PND, orthopnea, lightheadedness, edema, claudication. Respiratory: No pleurisy, hemoptysis, unusual sputum. Gastrointestinal: No nausea/ vomiting, bowel habit change, pain, TRINO symptoms, melena, hematochezia, anorexia. Psychiatric: No agitation, confusion/disorientation, suicidal or homicidal ideation. Musculoskeletal: no significant joint swelling, instability, focal weakness. OBJECTIVE  Constitutional: General Appearance:  well developed, obese, nontoxic, in no acute distress. Visit Vitals    /86 (BP 1 Location: Left arm, BP Patient Position: Sitting)    Pulse 62    Temp 97.8 °F (36.6 °C) (Oral)    Resp 15    Ht 5' 7\" (1.702 m)    Wt 265 lb 6.4 oz (120.4 kg)    SpO2 98%    BMI 41.57 kg/m2     Otoscopic Examination: external auditory canals are clear; tympanic membranes are dull. Nasal Cavity: mildly swollen mucosa & turbinates. Maxillas are not tender w/o redness or heat. Throat: clear tonsils, oropharynx, posterior pharynx. Cardiovascular[de-identified] Palpation: PMI not displaced or enlarged, no thrills or heaves. Auscultation: RRR; no murmur, rubs or gallops.    Extremities: no edema, no active varicosity. Pulmonary[de-identified] Respiratory effort: normal; no dyspnea, no retractions, no accessory muscle use. Auscultation: normal & symmetrical air exchange; no rales, no rhonchi, no wheeze; no rubs    Psychiatric: Oriented to time, place and person. Musculoskeletal: NC/AT. Neck-supple. Neurological[de-identified] CN I to XII: intact. DTR: symmetrical & normal. SLR- neg. Lab Results   Component Value Date/Time    Cholesterol, total 190 10/12/2016 08:52 AM    HDL Cholesterol 58 10/12/2016 08:52 AM    LDL, calculated 112 10/12/2016 08:52 AM    VLDL, calculated 20 10/12/2016 08:52 AM    Triglyceride 98 10/12/2016 08:52 AM    CHOL/HDL Ratio 3.4 02/18/2016 09:53 AM     Lab Results   Component Value Date/Time    WBC 8.4 02/18/2016 09:53 AM    HGB 15.0 02/18/2016 09:53 AM    HCT 45.1 02/18/2016 09:53 AM    PLATELET 932 47/52/9194 09:53 AM    MCV 95.3 02/18/2016 09:53 AM     Lab Results   Component Value Date/Time    Sodium 138 02/18/2016 09:53 AM    Potassium 3.6 02/18/2016 09:53 AM    Chloride 103 02/18/2016 09:53 AM    CO2 26 02/18/2016 09:53 AM    Anion gap 9 02/18/2016 09:53 AM    Glucose 92 02/18/2016 09:53 AM    BUN 14 02/18/2016 09:53 AM    Creatinine 0.60 02/18/2016 09:53 AM    BUN/Creatinine ratio 23 02/18/2016 09:53 AM    GFR est AA >60 02/18/2016 09:53 AM    GFR est non-AA >60 02/18/2016 09:53 AM    Calcium 9.2 02/18/2016 09:53 AM    Bilirubin, total 0.6 02/18/2016 09:53 AM    AST (SGOT) 23 02/18/2016 09:53 AM    Alk. phosphatase 131 02/18/2016 09:53 AM    Protein, total 7.6 02/18/2016 09:53 AM    Albumin 3.9 02/18/2016 09:53 AM    Globulin 3.7 02/18/2016 09:53 AM    A-G Ratio 1.1 02/18/2016 09:53 AM    ALT (SGPT) 40 02/18/2016 09:53 AM     Lab Results   Component Value Date/Time    TSH 1.60 02/18/2016 09:53 AM       ASSESSMENT    1. Essential hypertension    2. Moderate persistent asthma without complication    3. Chronic allergic rhinitis    4. Severe obesity (BMI >= 40) (HCC)    5.  Hyperlipidemia, unspecified hyperlipidemia type    6. Encounter for colorectal cancer screening        PLAN    Orders Placed This Encounter    METABOLIC PANEL, COMPREHENSIVE    CBC W/O DIFF    TSH 3RD GENERATION    LIPID PANEL    HEMOGLOBIN A1C WITH EAG    URINALYSIS W/ RFLX MICROSCOPIC    URIC ACID    OCCULT BLOOD, IMMUNOASSAY (FIT)    triamterene-hydroCHLOROthiazide (DYAZIDE) 37.5-25 mg per capsule    diph,Pertuss,Acell,,Tet Vac-PF (ADACEL) 2 Lf-(2.5-5-3-5 mcg)-5Lf/0.5 mL susp    pneumococcal 23-valent (PNEUMOVAX 23) 25 mcg/0.5 mL injection       Pharmacologic Management: Medications reviewed with the patient. No change. Non-Pharmacologic Management: Discussed risk/benefit & side effect of treatment. Discussed DDx, follow-up & work-up. Follow up in 3 mos, prn sooner. Asthma/ allergy reviewed. Low back care reviewed. Low salt ADA diet, weightloss & graduated excecise. Health risk from non adherence discussed. Patent voiced understanding. Follow-up Disposition:  Return in about 3 months (around 7/28/2017).                           Pilar Renteria MD

## 2017-04-28 NOTE — PROGRESS NOTES
Willam Maldonado is a 49 y.o.  female presents today for office visit for follow up. Pt is in Room # 4      1. Have you been to the ER, urgent care clinic since your last visit? Hospitalized since your last visit? Yes When: Last week  Where: [facility]gent [facility] [place] Reason for visit: Left  foot swollen    2. Have you seen or consulted any other health care providers outside of the [facility] since your last visit? Include any pap smears or colon screening. No    No Patient Care Coordination Note on file.

## 2017-04-29 NOTE — PROGRESS NOTES
Lab work came back stable. LDL was a little higher at 124. The patient is advised to continue with diet and exercise.

## 2017-05-01 ENCOUNTER — TELEPHONE (OUTPATIENT)
Dept: FAMILY MEDICINE CLINIC | Facility: CLINIC | Age: 52
End: 2017-05-01

## 2017-05-01 NOTE — TELEPHONE ENCOUNTER
Lab work came back stable.  LDL was a little higher at 124. The patient is advised to continue with diet and exercise. Spoke with pt in regards to results. Pt acknowledges understanding and voices no concerns at this time.

## 2017-06-15 ENCOUNTER — OFFICE VISIT (OUTPATIENT)
Dept: FAMILY MEDICINE CLINIC | Facility: CLINIC | Age: 52
End: 2017-06-15

## 2017-06-15 VITALS
RESPIRATION RATE: 15 BRPM | WEIGHT: 254 LBS | BODY MASS INDEX: 39.87 KG/M2 | HEIGHT: 67 IN | DIASTOLIC BLOOD PRESSURE: 86 MMHG | HEART RATE: 78 BPM | OXYGEN SATURATION: 98 % | TEMPERATURE: 97.4 F | SYSTOLIC BLOOD PRESSURE: 134 MMHG

## 2017-06-15 DIAGNOSIS — J45.41 MODERATE PERSISTENT ASTHMA WITH ACUTE EXACERBATION: Primary | ICD-10-CM

## 2017-06-15 DIAGNOSIS — I10 ESSENTIAL HYPERTENSION: ICD-10-CM

## 2017-06-15 DIAGNOSIS — R05.9 COUGH: ICD-10-CM

## 2017-06-15 DIAGNOSIS — J01.01 ACUTE RECURRENT MAXILLARY SINUSITIS: ICD-10-CM

## 2017-06-15 RX ORDER — DEXAMETHASONE 1.5 MG/1
TABLET ORAL
Qty: 16 TAB | Refills: 0 | Status: SHIPPED | OUTPATIENT
Start: 2017-06-15 | End: 2017-08-11

## 2017-06-15 RX ORDER — FLUTICASONE PROPIONATE AND SALMETEROL 250; 50 UG/1; UG/1
1 POWDER RESPIRATORY (INHALATION) EVERY 12 HOURS
Qty: 3 INHALER | Refills: 3 | Status: CANCELLED | OUTPATIENT
Start: 2017-06-15

## 2017-06-15 RX ORDER — CEFUROXIME AXETIL 500 MG/1
500 TABLET ORAL 2 TIMES DAILY
Qty: 20 TAB | Refills: 0 | Status: SHIPPED | OUTPATIENT
Start: 2017-06-15 | End: 2017-07-24

## 2017-06-15 RX ORDER — FLUTICASONE PROPIONATE AND SALMETEROL 500; 50 UG/1; UG/1
1 POWDER RESPIRATORY (INHALATION) EVERY 12 HOURS
Qty: 3 INHALER | Refills: 1 | Status: SHIPPED | OUTPATIENT
Start: 2017-06-15 | End: 2018-08-28 | Stop reason: SDUPTHER

## 2017-06-15 RX ORDER — HYDROCODONE POLISTIREX AND CHLORPHENIRAMINE POLISTIREX 10; 8 MG/5ML; MG/5ML
1 SUSPENSION, EXTENDED RELEASE ORAL
Qty: 115 ML | Refills: 0 | Status: SHIPPED | OUTPATIENT
Start: 2017-06-15 | End: 2017-07-24

## 2017-06-15 NOTE — PROGRESS NOTES
Luz Contreras is a 49 y.o.  female presents today for office visit for acute care. Pt is in Room # 4      1. Have you been to the ER, urgent care clinic since your last visit? Hospitalized since your last visit? No    2. Have you seen or consulted any other health care providers outside of the [address] [place] [address] since your last visit? Include any pap smears or colon screening. No    No Patient Care Coordination Note on file.

## 2017-06-15 NOTE — PROGRESS NOTES
SUBJECTIVE  Chief Complaint   Patient presents with    Asthma     exacerbation     HPI:     She has had bad cough with sinus congestion and pain on both side of face since 2 weeks ago, not getting better. She has scantily productive cough and dyspnea/ wheeze. She is on Advair and Singulair. Chest is getting sore from coughing. She has chronic rhinitis along with asthma. She has been using Flonase. Her BP is being treated. Past Medical History:   Diagnosis Date    Asthma     Cerebral palsy (HCC)     Cervical strain     GERD (gastroesophageal reflux disease)     Hyperlipidemia     Hypertension     Ovarian cyst     Sinusitis     with cough     Past Surgical History:   Procedure Laterality Date    HX CHOLECYSTECTOMY      HX HYSTERECTOMY      HX TUBAL LIGATION Bilateral         Current Outpatient Prescriptions   Medication Sig    triamterene-hydroCHLOROthiazide (DYAZIDE) 37.5-25 mg per capsule TAKE ONE CAPSULE BY MOUTH ONCE DAILY    multivitamin (ONE A DAY) tablet Take 1 Tab by mouth daily.  Omega-3-DHA-EPA-Fish Oil 1,000 mg (120 mg-180 mg) cap Take  by mouth.  albuterol (PROVENTIL HFA, VENTOLIN HFA, PROAIR HFA) 90 mcg/actuation inhaler Take 2 Puffs by inhalation every four (4) hours as needed for Wheezing.  naproxen (NAPROSYN) 500 mg tablet Take 1 Tab by mouth two (2) times daily as needed.  fluticasone (FLONASE) 50 mcg/actuation nasal spray 2 Sprays by Both Nostrils route daily.  EPINEPHrine (EPIPEN) 0.3 mg/0.3 mL injection 0.3 mL by IntraMUSCular route once as needed for up to 1 dose.  fluticasone-salmeterol (ADVAIR DISKUS) 250-50 mcg/dose diskus inhaler Take 1 Puff by inhalation every twelve (12) hours.  montelukast (SINGULAIR) 10 mg tablet Take 1 Tab by mouth daily.  guaiFENesin (MUCINEX) 1,200 mg Ta12 ER tablet Take 1 Tab by mouth two (2) times a day. (Patient taking differently: Take 1,200 mg by mouth daily as needed.)    CETIRIZINE HCL (ZYRTEC PO) Take  by mouth.  chlorpheniramine-HYDROcodone (TUSSIONEX) 10-8 mg/5 mL suspension Take 5 mL by mouth every twelve (12) hours as needed for Cough. Max Daily Amount: 10 mL. No current facility-administered medications for this visit. Allergies   Allergen Reactions    Latex Hives    Other Plant, Animal, Environmental Anaphylaxis     Wasp Sting    Prednisone Unable to Obtain     ROS:   Constitutional: No fever, chills, night sweats, dizziness. Ear/Nose/Throat: No new speaking or hearing problems, nose bleed. Cardiovascular: No angina, palpitations, PND, orthopnea, lightheadedness, edema, claudication. Respiratory: No pleurisy, hemoptysis, unusual sputum. Gastrointestinal: No nausea, bowel habit change, pain, TRINO symptoms, melena, hematochezia, anorexia. Psychiatric: No agitation, confusion/disorientation, suicidal or homicidal ideation. Musculoskeletal: no significant joint swelling, instability, focal weakness. OBJECTIVE  Constitutional: General Appearance:  well developed, obese, nontoxic, in no acute distress. Visit Vitals    /86 (BP 1 Location: Left arm, BP Patient Position: Sitting)    Pulse 78    Temp 97.4 °F (36.3 °C) (Oral)    Resp 15    Ht 5' 7\" (1.702 m)    Wt 254 lb (115.2 kg)    SpO2 98%    BMI 39.78 kg/m2     Otoscopic Examination: external auditory canals are clear; tympanic membranes are dull. Nasal Cavity: mildly swollen mucosa & turbinates. Maxilla is tender w/o redness or heat. Throat: clear tonsils, oropharynx, posterior pharynx. Cardiovascular[de-identified] Palpation: PMI not displaced or enlarged, no thrills or heaves. Auscultation: RRR; no murmur, rubs or gallops. Extremities: no edema, no active varicosity. Pulmonary[de-identified] Respiratory effort: no dyspnea, no retractions, no accessory muscle use. Auscultation: normal & symmetrical air exchange; no rales; diffuse mild rhonchi and wheeze; no rubs. GI[de-identified] Normal bowel sounds.   No masses; no tenderness; no rebound/rigidity; no CVA tenderness. No hepatosplenomegaly. Psychiatric: Oriented to time, place and person. Musculoskeletal: NC/AT. Neck-supple. CXR (5/9/16): Negative. ASSESSMENT    1. Moderate persistent asthma with acute exacerbation    2. Cough    3. Acute recurrent maxillary sinusitis    4. Essential hypertension        PLAN    Orders Placed This Encounter    cefUROXime (CEFTIN) 500 mg tablet    chlorpheniramine-HYDROcodone (TUSSIONEX) 10-8 mg/5 mL suspension    dexamethasone (DECADRON) 1.5 mg tablet    fluticasone-salmeterol (ADVAIR) 500-50 mcg/dose diskus inhaler     Pharmacologic Management: Medications reviewed with the patient. Ceftin 500 bid. Decadron 6 mg x 2d, 3 mg x 2d, 1.5 mg x4 days. PRN Tussionex. Increase Advair to 500 q12h. Non-Pharmacologic Management: Discussed risk/benefit & side effect of treatment. Discussed DDx, follow-up & work-up. Follow up for regular OV, prn sooner. PRN to ER. Asthma/ allergy reviewed again. Health risk from non adherence discussed. Patent voiced understanding.                           Teo Franz MD

## 2017-06-15 NOTE — LETTER
NOTIFICATION RETURN TO WORK / SCHOOL 
 
1/26/2016 3:32 PM 
 
Ms. Celeste Moreno [facility] [facility] [address] To Whom It May Concern: 
 
Celeste Moreno is currently under the care of [address] [place] [address]. She will return to work/school on 6/18/17. If there are questions or concerns please have the patient contact our office. Sincerely, [address] [facility], [place]

## 2017-07-24 ENCOUNTER — OFFICE VISIT (OUTPATIENT)
Dept: FAMILY MEDICINE CLINIC | Facility: CLINIC | Age: 52
End: 2017-07-24

## 2017-07-24 VITALS
HEART RATE: 50 BPM | TEMPERATURE: 97.7 F | WEIGHT: 258 LBS | HEIGHT: 67 IN | SYSTOLIC BLOOD PRESSURE: 128 MMHG | OXYGEN SATURATION: 96 % | DIASTOLIC BLOOD PRESSURE: 78 MMHG | BODY MASS INDEX: 40.49 KG/M2 | RESPIRATION RATE: 16 BRPM

## 2017-07-24 DIAGNOSIS — J45.40 MODERATE PERSISTENT ASTHMA WITHOUT COMPLICATION: ICD-10-CM

## 2017-07-24 DIAGNOSIS — E78.5 HYPERLIPIDEMIA, UNSPECIFIED HYPERLIPIDEMIA TYPE: ICD-10-CM

## 2017-07-24 DIAGNOSIS — J30.9 CHRONIC ALLERGIC RHINITIS: ICD-10-CM

## 2017-07-24 DIAGNOSIS — I10 ESSENTIAL HYPERTENSION: Primary | ICD-10-CM

## 2017-07-24 RX ORDER — LORATADINE 10 MG/1
10 TABLET ORAL DAILY
Qty: 30 TAB | Refills: 0
Start: 2017-07-24 | End: 2018-03-27

## 2017-07-24 NOTE — PROGRESS NOTES
SUBJECTIVE  Chief Complaint   Patient presents with    Hypertension    Asthma     HPI:   Her asthma is now better controlled. Her wheezes at night is better. She is on Singulair. She is off Zyrtec because it dries her up. She is doing well as long as not exposed to fumes and dust.    Her BP is controlled. She is not dieting for lipids or obesity. Her back pain is stable  She takes naproxen about tiw, only on days she has to lift a lot at work. Past Medical History:   Diagnosis Date    Asthma     Cerebral palsy (HCC)     Cervical strain     GERD (gastroesophageal reflux disease)     Hyperlipidemia     Hypertension     Ovarian cyst     Sinusitis     with cough     Past Surgical History:   Procedure Laterality Date    HX CHOLECYSTECTOMY      HX HYSTERECTOMY      HX TUBAL LIGATION Bilateral         Current Outpatient Prescriptions   Medication Sig    fluticasone-salmeterol (ADVAIR) 500-50 mcg/dose diskus inhaler Take 1 Puff by inhalation every twelve (12) hours.  triamterene-hydroCHLOROthiazide (DYAZIDE) 37.5-25 mg per capsule TAKE ONE CAPSULE BY MOUTH ONCE DAILY    multivitamin (ONE A DAY) tablet Take 1 Tab by mouth daily.  Omega-3-DHA-EPA-Fish Oil 1,000 mg (120 mg-180 mg) cap Take  by mouth.  albuterol (PROVENTIL HFA, VENTOLIN HFA, PROAIR HFA) 90 mcg/actuation inhaler Take 2 Puffs by inhalation every four (4) hours as needed for Wheezing.  naproxen (NAPROSYN) 500 mg tablet Take 1 Tab by mouth two (2) times daily as needed.  fluticasone (FLONASE) 50 mcg/actuation nasal spray 2 Sprays by Both Nostrils route daily.  EPINEPHrine (EPIPEN) 0.3 mg/0.3 mL injection 0.3 mL by IntraMUSCular route once as needed for up to 1 dose.  montelukast (SINGULAIR) 10 mg tablet Take 1 Tab by mouth daily.  guaiFENesin (MUCINEX) 1,200 mg Ta12 ER tablet Take 1 Tab by mouth two (2) times a day.  (Patient taking differently: Take 1,200 mg by mouth daily as needed.)    dexamethasone (DECADRON) 1.5 mg tablet 4 tab by mouth x 2d, 2 tab x2d, 1 tab x4 days.  CETIRIZINE HCL (ZYRTEC PO) Take  by mouth. No current facility-administered medications for this visit. Allergies   Allergen Reactions    Latex Hives    Other Plant, Animal, Environmental Anaphylaxis     Wasp Sting    Prednisone Unable to Obtain     ROS:   Constitutional: No fever, chills, night sweats, dizziness. Ear/Nose/Throat: No ear/ throat/ sinus pain, lesions, unusual discharge, new speaking or hearing problems, nose bleed. Cardiovascular: No angina, palpitations, PND, orthopnea, lightheadedness, edema, claudication. Respiratory: No pleurisy, hemoptysis, unusual sputum. Gastrointestinal: No nausea/ vomiting, bowel habit change, pain, TRINO symptoms, melena, hematochezia, anorexia. Psychiatric: No agitation, confusion/disorientation, suicidal or homicidal ideation. Musculoskeletal: no significant joint swelling, instability, focal weakness. OBJECTIVE  Constitutional: General Appearance:  well developed, obese, nontoxic, in no acute distress. Visit Vitals    /78 (BP 1 Location: Left arm, BP Patient Position: Sitting)    Pulse (!) 50    Temp 97.7 °F (36.5 °C) (Oral)    Resp 16    Ht 5' 7\" (1.702 m)    Wt 258 lb (117 kg)    SpO2 96%    BMI 40.41 kg/m2     Otoscopic Examination: external auditory canals are clear; tympanic membranes are dull. Nasal Cavity: mildly swollen mucosa & turbinates. Maxillas are not tender w/o redness or heat. Throat: clear tonsils, oropharynx, posterior pharynx. Cardiovascular[de-identified] Palpation: PMI not displaced or enlarged, no thrills or heaves. Auscultation: RRR; no murmur, rubs or gallops. Extremities: no edema, no active varicosity. Pulmonary[de-identified] Respiratory effort: normal; no dyspnea, no retractions, no accessory muscle use.    Auscultation: normal & symmetrical air exchange; no rales, no rhonchi, no wheeze; no rubs    Psychiatric: Oriented to time, place and person. Musculoskeletal: NC/AT. Neck-supple. Neurological[de-identified] CN I to XII: intact. DTR: symmetrical & normal. SLR- neg. Lab Results   Component Value Date/Time    Cholesterol, total 201 04/28/2017 08:47 AM    HDL Cholesterol 55 04/28/2017 08:47 AM    LDL, calculated 124.2 04/28/2017 08:47 AM    VLDL, calculated 21.8 04/28/2017 08:47 AM    Triglyceride 109 04/28/2017 08:47 AM    CHOL/HDL Ratio 3.7 04/28/2017 08:47 AM     Lab Results   Component Value Date/Time    WBC 5.1 04/28/2017 08:47 AM    HGB 14.3 04/28/2017 08:47 AM    HCT 44.3 04/28/2017 08:47 AM    PLATELET 613 92/98/0703 08:47 AM    MCV 96.7 04/28/2017 08:47 AM     Lab Results   Component Value Date/Time    Sodium 140 04/28/2017 08:47 AM    Potassium 3.6 04/28/2017 08:47 AM    Chloride 105 04/28/2017 08:47 AM    CO2 26 04/28/2017 08:47 AM    Anion gap 9 04/28/2017 08:47 AM    Glucose 85 04/28/2017 08:47 AM    BUN 13 04/28/2017 08:47 AM    Creatinine 0.60 04/28/2017 08:47 AM    BUN/Creatinine ratio 22 04/28/2017 08:47 AM    GFR est AA >60 04/28/2017 08:47 AM    GFR est non-AA >60 04/28/2017 08:47 AM    Calcium 9.7 04/28/2017 08:47 AM    Bilirubin, total 0.4 04/28/2017 08:47 AM    AST (SGOT) 30 04/28/2017 08:47 AM    Alk. phosphatase 129 04/28/2017 08:47 AM    Protein, total 7.3 04/28/2017 08:47 AM    Albumin 4.0 04/28/2017 08:47 AM    Globulin 3.3 04/28/2017 08:47 AM    A-G Ratio 1.2 04/28/2017 08:47 AM    ALT (SGPT) 44 04/28/2017 08:47 AM     Lab Results   Component Value Date/Time    TSH 2.50 04/28/2017 08:47 AM       ASSESSMENT    1. Essential hypertension    2. Chronic allergic rhinitis    3. Moderate persistent asthma without complication    4. Hyperlipidemia, unspecified hyperlipidemia type        PLAN      Pharmacologic Management: Medications reviewed with the patient. Claritin 10 every day. Plan of care (Diet and Exercise) discussed in details. Discussed risk/benefit & side effect of treatment. Discussed DDx, follow-up & work-up. Follow up in 3 mos, prn sooner. Asthma/ allergy reviewed. Low back care reviewed. Low salt ADA diet, weightloss & graduated excecise. Health risk from non adherence discussed. Patent voiced understanding. Follow-up Disposition:  Return in about 3 months (around 10/24/2017).                           Brea Meyer MD

## 2017-07-24 NOTE — PROGRESS NOTES
Kyle Bain is a 49 y.o.  female presents today for office visit for two week follow up. Pt is in Room # 5.      1. Have you been to the ER, urgent care clinic since your last visit? Hospitalized since your last visit? No    2. Have you seen or consulted any other health care providers outside of the [address] [place] [address] since your last visit? Include any pap smears or colon screening. No    Health Maintenance reviewed.

## 2017-08-06 RX ORDER — MONTELUKAST SODIUM 10 MG/1
TABLET ORAL
Qty: 90 TAB | Refills: 3 | Status: SHIPPED | OUTPATIENT
Start: 2017-08-06 | End: 2017-08-07 | Stop reason: SDUPTHER

## 2017-08-07 RX ORDER — MONTELUKAST SODIUM 10 MG/1
TABLET ORAL
Qty: 90 TAB | Refills: 3 | Status: SHIPPED | OUTPATIENT
Start: 2017-08-07 | End: 2018-11-13 | Stop reason: SDUPTHER

## 2017-08-11 ENCOUNTER — OFFICE VISIT (OUTPATIENT)
Dept: FAMILY MEDICINE CLINIC | Facility: CLINIC | Age: 52
End: 2017-08-11

## 2017-08-11 VITALS
DIASTOLIC BLOOD PRESSURE: 82 MMHG | SYSTOLIC BLOOD PRESSURE: 144 MMHG | BODY MASS INDEX: 40.02 KG/M2 | TEMPERATURE: 98.1 F | OXYGEN SATURATION: 97 % | HEIGHT: 67 IN | RESPIRATION RATE: 16 BRPM | HEART RATE: 81 BPM | WEIGHT: 255 LBS

## 2017-08-11 DIAGNOSIS — M54.5 CHRONIC MIDLINE LOW BACK PAIN, WITH SCIATICA PRESENCE UNSPECIFIED: ICD-10-CM

## 2017-08-11 DIAGNOSIS — J45.41 MODERATE PERSISTENT ASTHMA WITH ACUTE EXACERBATION: Primary | ICD-10-CM

## 2017-08-11 DIAGNOSIS — I10 ESSENTIAL HYPERTENSION: ICD-10-CM

## 2017-08-11 DIAGNOSIS — G89.29 CHRONIC MIDLINE LOW BACK PAIN, WITH SCIATICA PRESENCE UNSPECIFIED: ICD-10-CM

## 2017-08-11 RX ORDER — DEXAMETHASONE 1.5 MG/1
TABLET ORAL
Qty: 18 TAB | Refills: 0 | Status: SHIPPED | OUTPATIENT
Start: 2017-08-11 | End: 2018-01-02 | Stop reason: ALTCHOICE

## 2017-08-11 RX ORDER — ALBUTEROL SULFATE 90 UG/1
2 AEROSOL, METERED RESPIRATORY (INHALATION)
Qty: 1 INHALER | Refills: 0 | Status: SHIPPED | OUTPATIENT
Start: 2017-08-11 | End: 2018-08-28 | Stop reason: SDUPTHER

## 2017-08-11 RX ORDER — NEBULIZER AND COMPRESSOR
EACH MISCELLANEOUS
Qty: 1 EACH | Refills: 0 | Status: SHIPPED | OUTPATIENT
Start: 2017-08-11

## 2017-08-11 RX ORDER — IPRATROPIUM BROMIDE AND ALBUTEROL SULFATE 2.5; .5 MG/3ML; MG/3ML
3 SOLUTION RESPIRATORY (INHALATION)
Qty: 30 NEBULE | Refills: 0 | Status: SHIPPED | OUTPATIENT
Start: 2017-08-11

## 2017-08-11 RX ORDER — IPRATROPIUM BROMIDE AND ALBUTEROL SULFATE 2.5; .5 MG/3ML; MG/3ML
3 SOLUTION RESPIRATORY (INHALATION)
Qty: 3 ML | Refills: 0
Start: 2017-08-11 | End: 2017-08-11

## 2017-08-11 RX ORDER — NAPROXEN 500 MG/1
500 TABLET ORAL
Qty: 30 TAB | Refills: 0 | Status: SHIPPED | OUTPATIENT
Start: 2017-08-11 | End: 2017-10-30 | Stop reason: SDUPTHER

## 2017-08-11 RX ORDER — HYDROCODONE POLISTIREX AND CHLORPHENIRAMINE POLISTIREX 10; 8 MG/5ML; MG/5ML
1 SUSPENSION, EXTENDED RELEASE ORAL
Qty: 115 ML | Refills: 0 | Status: SHIPPED | OUTPATIENT
Start: 2017-08-11 | End: 2018-01-02 | Stop reason: ALTCHOICE

## 2017-08-11 NOTE — PROGRESS NOTES
Maxime Viera is a 49 y.o.  female presents today for office visit for follow up. Pt is in Room # 4      1. Have you been to the ER, urgent care clinic since your last visit? Hospitalized since your last visit? No    2. Have you seen or consulted any other health care providers outside of the [facility] since your last visit? Include any pap smears or colon screening. No    No Patient Care Coordination Note on file.

## 2017-08-11 NOTE — PROGRESS NOTES
SUBJECTIVE  Chief Complaint   Patient presents with    Asthma     HPI:     She has had bad cough with sinus congestion since 3 days ago. She has scantily productive cough and dyspnea/ wheeze. She is on Advair and Singulair. Chest is getting sore from coughing. She has chronic rhinitis along with asthma. She has been using Flonase and Claritin. She denies any chills or fever or malaise or sore throat or sinus pain. Her BP is being treated. Her LBP is stable with occasional Ibuprofen    Past Medical History:   Diagnosis Date    Asthma     Cerebral palsy (HCC)     Cervical strain     GERD (gastroesophageal reflux disease)     Hyperlipidemia     Hypertension     Ovarian cyst     Sinusitis     with cough     Past Surgical History:   Procedure Laterality Date    HX CHOLECYSTECTOMY      HX HYSTERECTOMY      HX TUBAL LIGATION Bilateral         Current Outpatient Prescriptions   Medication Sig    montelukast (SINGULAIR) 10 mg tablet TAKE ONE TABLET BY MOUTH ONCE DAILY    loratadine (CLARITIN) 10 mg tablet Take 1 Tab by mouth daily.  fluticasone-salmeterol (ADVAIR) 500-50 mcg/dose diskus inhaler Take 1 Puff by inhalation every twelve (12) hours.  triamterene-hydroCHLOROthiazide (DYAZIDE) 37.5-25 mg per capsule TAKE ONE CAPSULE BY MOUTH ONCE DAILY    multivitamin (ONE A DAY) tablet Take 1 Tab by mouth daily.  Omega-3-DHA-EPA-Fish Oil 1,000 mg (120 mg-180 mg) cap Take  by mouth.  albuterol (PROVENTIL HFA, VENTOLIN HFA, PROAIR HFA) 90 mcg/actuation inhaler Take 2 Puffs by inhalation every four (4) hours as needed for Wheezing.  naproxen (NAPROSYN) 500 mg tablet Take 1 Tab by mouth two (2) times daily as needed.  fluticasone (FLONASE) 50 mcg/actuation nasal spray 2 Sprays by Both Nostrils route daily.  EPINEPHrine (EPIPEN) 0.3 mg/0.3 mL injection 0.3 mL by IntraMUSCular route once as needed for up to 1 dose.     guaiFENesin (MUCINEX) 1,200 mg Ta12 ER tablet Take 1 Tab by mouth two (2) times a day. (Patient taking differently: Take 1,200 mg by mouth daily as needed.)     No current facility-administered medications for this visit. Allergies   Allergen Reactions    Latex Hives    Other Plant, Animal, Environmental Anaphylaxis     Wasp Sting    Prednisone Unable to Obtain     ROS:   Constitutional: No fever, chills, night sweats, dizziness. Ear/Nose/Throat: No ear/ throat/ sinus pain, lesions, unusual discharge, new speaking or hearing problems, nose bleed. Cardiovascular: No angina, palpitations, PND, orthopnea, lightheadedness, edema, claudication. Respiratory: No pleurisy, hemoptysis, unusual sputum. Gastrointestinal: No nausea, bowel habit change, pain, TRINO symptoms, melena, hematochezia, anorexia. Psychiatric: No agitation, confusion/disorientation, suicidal or homicidal ideation. Musculoskeletal: no significant joint swelling, instability, focal weakness. OBJECTIVE  Constitutional: General Appearance:  well developed, obese, nontoxic, in no acute distress. Visit Vitals    /82 (BP 1 Location: Right arm, BP Patient Position: Sitting)    Pulse 81    Temp 98.1 °F (36.7 °C) (Oral)    Resp 16    Ht 5' 7\" (1.702 m)    Wt 255 lb (115.7 kg)    SpO2 97%    BMI 39.94 kg/m2     Otoscopic Examination: external auditory canals are clear; tympanic membranes are dull. Nasal Cavity: mildly swollen mucosa & turbinates. Maxilla is not tender w/o redness or heat. Throat: clear tonsils, oropharynx, posterior pharynx. Cardiovascular[de-identified] Palpation: PMI not displaced or enlarged, no thrills or heaves. Auscultation: RRR; no murmur, rubs or gallops. Extremities: no edema, no active varicosity. Pulmonary[de-identified] Coughing in spasm. Respiratory effort: mild dyspnea, no retractions, no accessory muscle use. Auscultation: normal & symmetrical air exchange; no rales; no rhonchi or wheeze; no rubs. Cough significantly better with HHN Rx/ Duoneb.       GI[de-identified] Normal bowel sounds. No masses; no tenderness; no rebound/rigidity; no CVA tenderness. No hepatosplenomegaly. Psychiatric: Oriented to time, place and person. Musculoskeletal: NC/AT. Neck-supple. ASSESSMENT    1. Moderate persistent asthma with acute exacerbation    2. Essential hypertension    3. Chronic midline low back pain, with sciatica presence unspecified        PLAN    Orders Placed This Encounter    PULMONARY FUNCTION TEST    NC INHAL RX, AIRWAY OBST/DX SPUTUM INDUCT    naproxen (NAPROSYN) 500 mg tablet    albuterol (PROVENTIL HFA, VENTOLIN HFA, PROAIR HFA) 90 mcg/actuation inhaler    chlorpheniramine-HYDROcodone (TUSSIONEX) 10-8 mg/5 mL suspension    dexamethasone (DECADRON) 1.5 mg tablet    Nebulizer & Compressor machine    albuterol-ipratropium (DUO-NEB) 2.5 mg-0.5 mg/3 ml nebu    albuterol-ipratropium (DUO-NEB) 2.5 mg-0.5 mg/3 ml nebu     Pharmacologic Management: Medications reviewed with the patient. Decadron 6 mg x 2d, 3 mg x 2d, 1.5 mg x4 days. PRN Tussionex. Non-Pharmacologic Management: Discussed risk/benefit & side effect of treatment. Discussed DDx, follow-up & work-up. Follow up for regular OV, prn sooner. PRN to ER. Asthma/ allergy reviewed again. Health risk from non adherence discussed. Patent voiced understanding. Follow-up Disposition:  Return if symptoms worsen or fail to improve.                           Mariangel Navarro MD

## 2017-08-11 NOTE — LETTER
NOTIFICATION OF RETURN TO WORK / SCHOOL 
 
8/11/2017 10:53 AM 
 
Ms. Padilla Staff [address] [facility] [address] [facility] To Whom It May Concern: 
 
Valerie Staff was under the care of [address] on 8/11/2017 She will be able to return to work/school today  with no restrictions. If there are questions or concerns please have the patient contact our office.  
 
Sincerely, 
 
 
Gaviota Montemayor MD

## 2017-10-30 RX ORDER — NAPROXEN 500 MG/1
500 TABLET ORAL
Qty: 30 TAB | Refills: 0 | Status: SHIPPED | OUTPATIENT
Start: 2017-10-30 | End: 2018-04-16 | Stop reason: SDUPTHER

## 2018-01-02 ENCOUNTER — OFFICE VISIT (OUTPATIENT)
Dept: FAMILY MEDICINE CLINIC | Age: 53
End: 2018-01-02

## 2018-01-02 VITALS
OXYGEN SATURATION: 97 % | HEIGHT: 67 IN | DIASTOLIC BLOOD PRESSURE: 84 MMHG | SYSTOLIC BLOOD PRESSURE: 130 MMHG | BODY MASS INDEX: 41.12 KG/M2 | RESPIRATION RATE: 17 BRPM | TEMPERATURE: 97.2 F | WEIGHT: 262 LBS | HEART RATE: 88 BPM

## 2018-01-02 DIAGNOSIS — J45.30 MILD PERSISTENT ASTHMA WITHOUT COMPLICATION: ICD-10-CM

## 2018-01-02 DIAGNOSIS — E78.5 HYPERLIPIDEMIA, UNSPECIFIED HYPERLIPIDEMIA TYPE: ICD-10-CM

## 2018-01-02 DIAGNOSIS — I10 ESSENTIAL HYPERTENSION: Primary | ICD-10-CM

## 2018-01-02 DIAGNOSIS — E66.01 SEVERE OBESITY (BMI >= 40) (HCC): ICD-10-CM

## 2018-01-02 DIAGNOSIS — M72.2 PLANTAR FASCIITIS OF LEFT FOOT: ICD-10-CM

## 2018-01-02 RX ORDER — BENZONATATE 200 MG/1
1 CAPSULE ORAL
COMMUNITY
Start: 2017-12-22 | End: 2019-02-12

## 2018-01-02 RX ORDER — CETIRIZINE HCL 10 MG
10 TABLET ORAL
COMMUNITY
End: 2018-03-27

## 2018-01-02 NOTE — PROGRESS NOTES
SUBJECTIVE  Chief Complaint   Patient presents with    Asthma    Hypertension    Back Pain    Foot Pain     LEFT FOOT     HPI:     She has pain in her Lt foot for the last 6 weeks. She has been following foot care for the last 2 weeks and her foot feels better. Her asthma is now better controlled. No more wheezes at night. She is on Singulair. She is doing well as long as not exposed to extreme heat, fumes and dust.    Her BP is controlled with current Rx. She is following life style as tolerated for HTN,  lipids or obesity. Her back pain is stable  She takes naproxen about tiw, only on days she has to lift a lot at work. Past Medical History:   Diagnosis Date    Asthma     Cerebral palsy (HCC)     Cervical strain     GERD (gastroesophageal reflux disease)     Hyperlipidemia     Hypertension     Ovarian cyst     Sinusitis     with cough     Past Surgical History:   Procedure Laterality Date    HX CHOLECYSTECTOMY      HX HYSTERECTOMY      HX TUBAL LIGATION Bilateral         Current Outpatient Prescriptions   Medication Sig    benzonatate (TESSALON) 200 mg capsule Take 1 Cap by mouth three (3) times daily as needed.  cetirizine (ZYRTEC) 10 mg tablet Take 10 mg by mouth nightly.  naproxen (NAPROSYN) 500 mg tablet Take 1 Tab by mouth two (2) times daily as needed.  triamterene-hydroCHLOROthiazide (DYAZIDE) 37.5-25 mg per capsule TAKE ONE CAPSULE BY MOUTH ONCE DAILY    albuterol (PROVENTIL HFA, VENTOLIN HFA, PROAIR HFA) 90 mcg/actuation inhaler Take 2 Puffs by inhalation every four (4) hours as needed for Wheezing.  Nebulizer & Compressor machine Use as directed.  albuterol-ipratropium (DUO-NEB) 2.5 mg-0.5 mg/3 ml nebu 3 mL by Nebulization route four (4) times daily as needed.  montelukast (SINGULAIR) 10 mg tablet TAKE ONE TABLET BY MOUTH ONCE DAILY    fluticasone-salmeterol (ADVAIR) 500-50 mcg/dose diskus inhaler Take 1 Puff by inhalation every twelve (12) hours.     multivitamin (ONE A DAY) tablet Take 1 Tab by mouth daily.  Omega-3-DHA-EPA-Fish Oil 1,000 mg (120 mg-180 mg) cap Take  by mouth.  fluticasone (FLONASE) 50 mcg/actuation nasal spray 2 Sprays by Both Nostrils route daily.  EPINEPHrine (EPIPEN) 0.3 mg/0.3 mL injection 0.3 mL by IntraMUSCular route once as needed for up to 1 dose.  guaiFENesin (MUCINEX) 1,200 mg Ta12 ER tablet Take 1 Tab by mouth two (2) times a day. (Patient taking differently: Take 1,200 mg by mouth daily as needed.)    loratadine (CLARITIN) 10 mg tablet Take 1 Tab by mouth daily. No current facility-administered medications for this visit. Allergies   Allergen Reactions    Latex Hives    Other Plant, Animal, Environmental Anaphylaxis     Wasp Sting    Prednisone Unable to Obtain     ROS:   Constitutional: No fever, chills, night sweats, dizziness. Ear/Nose/Throat: No ear/ throat/ sinus pain, lesions, unusual discharge, new speaking or hearing problems, nose bleed. Cardiovascular: No angina, palpitations, PND, orthopnea, lightheadedness, edema, claudication. Respiratory: No pleurisy, hemoptysis, unusual sputum. Gastrointestinal: No nausea/ vomiting, bowel habit change, pain, TRINO symptoms, melena, hematochezia, anorexia. Psychiatric: No agitation, confusion/disorientation, suicidal or homicidal ideation. Musculoskeletal: no significant joint swelling, instability, focal weakness. OBJECTIVE  Constitutional: General Appearance:  well developed, obese, nontoxic, in no acute distress. Visit Vitals    /84 (BP 1 Location: Right arm, BP Patient Position: Sitting)    Pulse 88    Temp 97.2 °F (36.2 °C) (Oral)    Resp 17    Ht 5' 7\" (1.702 m)    Wt 262 lb (118.8 kg)    SpO2 97%    BMI 41.04 kg/m2     Otoscopic Examination: external auditory canals are clear; tympanic membranes are dull. Nasal Cavity: mildly swollen mucosa & turbinates. Maxillas are not tender w/o redness or heat.  Throat: clear tonsils, oropharynx, posterior pharynx. Cardiovascular[de-identified] Palpation: PMI not displaced or enlarged, no thrills or heaves. Auscultation: RRR; no murmur, rubs or gallops. Extremities: no edema, no active varicosity. Pulmonary[de-identified] Respiratory effort: normal; no dyspnea, no retractions, no accessory muscle use. Auscultation: normal & symmetrical air exchange; no rales, no rhonchi, no wheeze; no rubs    Psychiatric: Oriented to time, place and person. Musculoskeletal:   NC/AT. Neck-supple. Left foot: No heat, effusion, redness or swelling with tenderness of proximal planter fascia. Good ROM. No instability. Strength OK. Neurological[de-identified] CN I to XII: intact. DTR: symmetrical & normal. SLR- neg. Lab Results   Component Value Date/Time    Cholesterol, total 201 04/28/2017 08:47 AM    HDL Cholesterol 55 04/28/2017 08:47 AM    LDL, calculated 124.2 04/28/2017 08:47 AM    VLDL, calculated 21.8 04/28/2017 08:47 AM    Triglyceride 109 04/28/2017 08:47 AM    CHOL/HDL Ratio 3.7 04/28/2017 08:47 AM     Lab Results   Component Value Date/Time    WBC 5.1 04/28/2017 08:47 AM    HGB 14.3 04/28/2017 08:47 AM    HCT 44.3 04/28/2017 08:47 AM    PLATELET 690 09/52/8952 08:47 AM    MCV 96.7 04/28/2017 08:47 AM     Lab Results   Component Value Date/Time    Sodium 140 04/28/2017 08:47 AM    Potassium 3.6 04/28/2017 08:47 AM    Chloride 105 04/28/2017 08:47 AM    CO2 26 04/28/2017 08:47 AM    Anion gap 9 04/28/2017 08:47 AM    Glucose 85 04/28/2017 08:47 AM    BUN 13 04/28/2017 08:47 AM    Creatinine 0.60 04/28/2017 08:47 AM    BUN/Creatinine ratio 22 04/28/2017 08:47 AM    GFR est AA >60 04/28/2017 08:47 AM    GFR est non-AA >60 04/28/2017 08:47 AM    Calcium 9.7 04/28/2017 08:47 AM    Bilirubin, total 0.4 04/28/2017 08:47 AM    AST (SGOT) 30 04/28/2017 08:47 AM    Alk.  phosphatase 129 04/28/2017 08:47 AM    Protein, total 7.3 04/28/2017 08:47 AM    Albumin 4.0 04/28/2017 08:47 AM    Globulin 3.3 04/28/2017 08:47 AM A-G Ratio 1.2 04/28/2017 08:47 AM    ALT (SGPT) 44 04/28/2017 08:47 AM     Lab Results   Component Value Date/Time    TSH 2.50 04/28/2017 08:47 AM       ASSESSMENT    1. Essential hypertension    2. Mild persistent asthma without complication    3. Hyperlipidemia, unspecified hyperlipidemia type    4. Severe obesity (BMI >= 40) (HCC)    5. Plantar fasciitis of left foot        PLAN    She declines BW until next OV. Pharmacologic Management: Medications reviewed with the patient. No change. Gentle Foot ROM exercises, moist heat and posture care discussed. Plan of care (Diet and Exercise) discussed in details. Discussed risk/benefit & side effect of treatment. Discussed DDx, follow-up & work-up. Follow up in 3 mos, prn sooner. Asthma/ allergy reviewed. Low back care reviewed. Low salt ADA diet, weightloss & graduated excecise. Health risk from non adherence discussed. Patent voiced understanding. HM: she has had Tdap and Pneumovax. Defers FIT till next OV.     Follow-up Disposition:  Return in about 3 months (around 4/2/2018) for Fasting physical..                          Ramón Andrews MD

## 2018-01-02 NOTE — PROGRESS NOTES
Swathi Chao is a 47 y.o. female presents in office for 10 wk f/u and left foot pain    Health Maintenance Due   Topic Date Due    Pneumococcal 19-64 Medium Risk (1 of 1 - PPSV23) 09/01/1985    DTaP/Tdap/Td series (1 - Tdap) 09/01/1987    FOBT Q 1 YEAR AGE 50-75  09/01/2016    Influenza Age 9 to Adult  08/01/2017       1. Have you been to the ER, urgent care clinic since your last visit? Hospitalized since your last visit? Yes. 1 week ago. [facility] URI with asthma exacerbation. 2. Have you seen or consulted any other health care providers outside of the [address] [place] [address] since your last visit? Include any pap smears or colon screening.  No

## 2018-01-11 ENCOUNTER — PATIENT OUTREACH (OUTPATIENT)
Dept: FAMILY MEDICINE CLINIC | Facility: CLINIC | Age: 53
End: 2018-01-11

## 2018-01-18 ENCOUNTER — PATIENT OUTREACH (OUTPATIENT)
Dept: FAMILY MEDICINE CLINIC | Age: 53
End: 2018-01-18

## 2018-01-19 NOTE — PROGRESS NOTES
NN health screening:     Ms Jeffrey Albrecht [facility] for me indicating she would be following up with Dr. Oj Cage \"sometime in April\" and \"would address her screenings then\". I have closed this episode of care.

## 2018-01-19 NOTE — PROGRESS NOTES
NN health screening:    Noted mammogram indicates due this year (2 year interval screenings) and she has had hysterectomy so no longer needs pap smears.

## 2018-03-27 ENCOUNTER — OFFICE VISIT (OUTPATIENT)
Dept: FAMILY MEDICINE CLINIC | Age: 53
End: 2018-03-27

## 2018-03-27 VITALS
HEART RATE: 87 BPM | SYSTOLIC BLOOD PRESSURE: 177 MMHG | WEIGHT: 262 LBS | BODY MASS INDEX: 41.12 KG/M2 | TEMPERATURE: 98.2 F | RESPIRATION RATE: 16 BRPM | OXYGEN SATURATION: 98 % | DIASTOLIC BLOOD PRESSURE: 115 MMHG | HEIGHT: 67 IN

## 2018-03-27 DIAGNOSIS — J06.9 VIRAL UPPER RESPIRATORY TRACT INFECTION: Primary | ICD-10-CM

## 2018-03-27 RX ORDER — METHYLPREDNISOLONE 4 MG/1
TABLET ORAL
Qty: 1 DOSE PACK | Refills: 0 | Status: SHIPPED | OUTPATIENT
Start: 2018-03-27 | End: 2018-04-16

## 2018-03-27 RX ORDER — FLUTICASONE PROPIONATE AND SALMETEROL 250; 50 UG/1; UG/1
1 POWDER RESPIRATORY (INHALATION) EVERY 12 HOURS
Qty: 60 EACH | Refills: 2 | Status: SHIPPED | OUTPATIENT
Start: 2018-03-27 | End: 2018-04-26

## 2018-03-27 RX ORDER — HYDROCODONE POLISTIREX AND CHLORPHENIRAMINE POLISTIREX 10; 8 MG/5ML; MG/5ML
5 SUSPENSION, EXTENDED RELEASE ORAL
Qty: 115 ML | Refills: 0 | Status: SHIPPED | OUTPATIENT
Start: 2018-03-27 | End: 2018-04-03

## 2018-03-27 NOTE — MR AVS SNAPSHOT
[facility] 
 
 
 [address] [facility] [address] [facility] [address] 
[phone] Patient: Lester Hernandez MRN: [id number] LHX:[id number]/[id number] Visit Information Date & Time Provider Department Dept. Phone Encounter #  
 3/27/2018  3:30 PM RAKESH Merritt [facility] [phone] [id number] Your Appointments 4/2/2018  8:30 AM  
PHYSICAL with MD RAKESH Clinton [facility] Appt Note: 3 mon f/u  
 [address] [facility] [address] [place] [address]  
[phone]  
  
   
 [address] [place] [address] Upcoming Health Maintenance Date Due Pneumococcal 19-64 Medium Risk (1 of 1 - PPSV23) 9/1/1985 DTaP/Tdap/Td series (1 - Tdap) 9/1/1987 FOBT Q 1 YEAR AGE 50-75 9/1/2016 Influenza Age 5 to Adult 8/1/2017 BREAST CANCER SCRN MAMMOGRAM 7/10/2019 PAP AKA CERVICAL CYTOLOGY 10/12/2019 Allergies as of 3/27/2018  Review Complete On: 3/27/2018 By: JOURDAN Merritt Severity Noted Reaction Type Reactions Latex  02/16/2015    Hives Other Plant, Animal, Environmental High 01/26/2016    Anaphylaxis Wasp Sting Prednisone    Unable to Obtain Current Immunizations  Never Reviewed No immunizations on file. Not reviewed this visit You Were Diagnosed With   
  
 Codes Comments Viral upper respiratory tract infection    -  Primary ICD-10-CM: J06.9, B97.89 ICD-9-CM: 465.9 Vitals BP Pulse Temp Resp Height(growth percentile) Weight(growth percentile) (!) 177/115 87 98.2 °F (36.8 °C) (Oral) 16 5' 7.01\" (1.702 m) 262 lb (118.8 kg) SpO2 BMI OB Status Smoking Status 98% 41.03 kg/m2 Hysterectomy Never Smoker Vitals History BMI and BSA Data Body Mass Index Body Surface Area 41.03 kg/m 2 2.37 m 2 Preferred Pharmacy Pharmacy Name Phone [address] [phone] Your Updated Medication List  
  
   
This list is accurate as of 3/27/18  4:01 PM.  Always use your most recent med list.  
  
  
  
  
 albuterol 90 mcg/actuation inhaler Commonly known as:  PROVENTIL HFA, VENTOLIN HFA, PROAIR HFA Take 2 Puffs by inhalation every four (4) hours as needed for Wheezing. albuterol-ipratropium 2.5 mg-0.5 mg/3 ml Nebu Commonly known as:  DUO-NEB  
3 mL by Nebulization route four (4) times daily as needed. benzonatate 200 mg capsule Commonly known as:  TESSALON Take 1 Cap by mouth three (3) times daily as needed. chlorpheniramine-HYDROcodone 10-8 mg/5 mL suspension Commonly known as:  Shaaron Linear Take 5 mL by mouth every twelve (12) hours as needed for Cough for up to 7 days. Max Daily Amount: 10 mL. EPINEPHrine 0.3 mg/0.3 mL injection Commonly known as:  EPIPEN  
0.3 mL by IntraMUSCular route once as needed for up to 1 dose. fluticasone 50 mcg/actuation nasal spray Commonly known as:  Vasile Cushing 2 Sprays by Both Nostrils route daily. * fluticasone-salmeterol 500-50 mcg/dose diskus inhaler Commonly known as:  ADVAIR Take 1 Puff by inhalation every twelve (12) hours. * fluticasone-salmeterol 250-50 mcg/dose diskus inhaler Commonly known as:  ADVAIR Take 1 Puff by inhalation every twelve (12) hours for 30 days. guaiFENesin 1,200 mg Ta12 ER tablet Commonly known as:  [address] Take 1 Tab by mouth two (2) times a day. methylPREDNISolone 4 mg tablet Commonly known as:  Bria Junior Take as directed  
  
 montelukast 10 mg tablet Commonly known as:  SINGULAIR  
TAKE ONE TABLET BY MOUTH ONCE DAILY  
  
 multivitamin tablet Commonly known as:  ONE A DAY Take 1 Tab by mouth daily. naproxen 500 mg tablet Commonly known as:  NAPROSYN Take 1 Tab by mouth two (2) times daily as needed. Nebulizer & Compressor machine Use as directed. omega 3-DHA-EPA-fish oil 1,000 mg (120 mg-180 mg) capsule Take  by mouth.  
  
 triamterene-hydroCHLOROthiazide 37.5-25 mg per capsule Commonly known as:  Rosebud Mandril TAKE ONE CAPSULE BY MOUTH ONCE DAILY * Notice: This list has 2 medication(s) that are the same as other medications prescribed for you. Read the directions carefully, and ask your doctor or other care provider to review them with you. Prescriptions Printed Refills  
 chlorpheniramine-HYDROcodone (TUSSIONEX) 10-8 mg/5 mL suspension 0 Sig: Take 5 mL by mouth every twelve (12) hours as needed for Cough for up to 7 days. Max Daily Amount: 10 mL. Class: Print Route: Oral  
 methylPREDNISolone (MEDROL DOSEPACK) 4 mg tablet 0 Sig: Take as directed Class: Print  
 fluticasone-salmeterol (ADVAIR) 250-50 mcg/dose diskus inhaler 2 Sig: Take 1 Puff by inhalation every twelve (12) hours for 30 days. Class: Print Route: Inhalation Patient Instructions Upper Respiratory Infection (Cold): Care Instructions Your Care Instructions An upper respiratory infection, or URI, is an infection of the nose, sinuses, or throat. URIs are spread by coughs, sneezes, and direct contact. The common cold is the most frequent kind of URI. The flu and sinus infections are other kinds of URIs. Almost all URIs are caused by viruses. Antibiotics won't cure them. But you can treat most infections with home care. This may include drinking lots of fluids and taking over-the-counter pain medicine. You will probably feel better in 4 to 10 days. The doctor has checked you carefully, but problems can develop later. If you notice any problems or new symptoms, get medical treatment right away. Follow-up care is a key part of your treatment and safety.  Be sure to make and go to all appointments, and call your doctor if you are having problems. It's also a good idea to know your test results and keep a list of the medicines you take. How can you care for yourself at home? · To prevent dehydration, drink plenty of fluids, enough so that your urine is light yellow or clear like water. Choose water and other caffeine-free clear liquids until you feel better. If you have kidney, heart, or liver disease and have to limit fluids, talk with your doctor before you increase the amount of fluids you drink. · Take an over-the-counter pain medicine, such as acetaminophen (Tylenol), ibuprofen (Advil, Motrin), or naproxen (Aleve). Read and follow all instructions on the label. · Before you use cough and cold medicines, check the label. These medicines may not be safe for young children or for people with certain health problems. · Be careful when taking over-the-counter cold or flu medicines and Tylenol at the same time. Many of these medicines have acetaminophen, which is Tylenol. Read the labels to make sure that you are not taking more than the recommended dose. Too much acetaminophen (Tylenol) can be harmful. · Get plenty of rest. 
· Do not smoke or allow others to smoke around you. If you need help quitting, talk to your doctor about stop-smoking programs and medicines. These can increase your chances of quitting for good. When should you call for help? Call 911 anytime you think you may need emergency care. For example, call if: 
? · You have severe trouble breathing. ?Call your doctor now or seek immediate medical care if: 
? · You seem to be getting much sicker. ? · You have new or worse trouble breathing. ? · You have a new or higher fever. ? · You have a new rash. ? Watch closely for changes in your health, and be sure to contact your doctor if: 
? · You have a new symptom, such as a sore throat, an earache, or sinus pain.   
? · You cough more deeply or more often, especially if you notice more mucus or a change in the color of your mucus. ? · You do not get better as expected. Where can you learn more? Go to http://tavon-kareen.info/. Enter Q958 in the search box to learn more about \"Upper Respiratory Infection (Cold): Care Instructions. \" Current as of: May 12, 2017 Content Version: 11.4 © 1981-4372 [employer]. Care instructions adapted under license by [employer] (which disclaims liability or warranty for this information). If you have questions about a medical condition or this instruction, always ask your healthcare professional. [place] [address] any warranty or liability for your use of this information. Introducing [facility]! Debkristana Form introduces [employer] patient portal. Now you can access parts of your medical record, email your doctor's office, and request medication refills online. 1. In your internet browser, go to [email or web address] 2. Click on the First Time User? Click Here link in the Sign In box. You will see the New Member Sign Up page. 3. Enter your [employer] Access Code exactly as it appears below. You will not need to use this code after youve completed the sign-up process. If you do not sign up before the expiration date, you must request a new code. · [employer] Access Code: [email or web address] Expires: 6/25/2018  4:01 PM 
 
4. Enter the last four digits of your Social Security Number (xxxx) and Date of Birth (mm/dd/yyyy) as indicated and click Submit. You will be taken to the next sign-up page. 5. Create a NemeriXt ID. This will be your [employer] login ID and cannot be changed, so think of one that is secure and easy to remember. 6. Create a [employer] password. You can change your password at any time. 7. Enter your Password Reset Question and Answer. This can be used at a later time if you forget your password. 8. Enter your e-mail address. You will receive e-mail notification when new information is available in [address] 9. Click Sign Up. You can now view and download portions of your medical record. 10. Click the Download Summary menu link to download a portable copy of your medical information. If you have questions, please visit the Frequently Asked Questions section of the [email or web address] website. Remember, [email or web address] is NOT to be used for urgent needs. For medical emergencies, dial 911. Now available from your iPhone and Android! Please provide this summary of care documentation to your next provider. Your primary care clinician is listed as [address] [place] [address]. If you have any questions after today's visit, please call [phone].

## 2018-03-27 NOTE — PROGRESS NOTES
Debbie Mayer is a 47 y.o. female presents in office to be seen for cough x 6 days. Health Maintenance Due   Topic Date Due    Pneumococcal 19-64 Medium Risk (1 of 1 - PPSV23) 09/01/1985    DTaP/Tdap/Td series (1 - Tdap) 09/01/1987    FOBT Q 1 YEAR AGE 50-75  09/01/2016    Influenza Age 9 to Adult  08/01/2017       1. Have you been to the ER, urgent care clinic since your last visit? Hospitalized since your last visit? [facility]    2. Have you seen or consulted any other health care providers outside of the [address] [place] [address] since your last visit? Include any pap smears or colon screening.  no

## 2018-03-27 NOTE — PATIENT INSTRUCTIONS
Upper Respiratory Infection (Cold): Care Instructions  Your Care Instructions    An upper respiratory infection, or URI, is an infection of the nose, sinuses, or throat. URIs are spread by coughs, sneezes, and direct contact. The common cold is the most frequent kind of URI. The flu and sinus infections are other kinds of URIs. Almost all URIs are caused by viruses. Antibiotics won't cure them. But you can treat most infections with home care. This may include drinking lots of fluids and taking over-the-counter pain medicine. You will probably feel better in 4 to 10 days. The doctor has checked you carefully, but problems can develop later. If you notice any problems or new symptoms, get medical treatment right away. Follow-up care is a key part of your treatment and safety. Be sure to make and go to all appointments, and call your doctor if you are having problems. It's also a good idea to know your test results and keep a list of the medicines you take. How can you care for yourself at home? · To prevent dehydration, drink plenty of fluids, enough so that your urine is light yellow or clear like water. Choose water and other caffeine-free clear liquids until you feel better. If you have kidney, heart, or liver disease and have to limit fluids, talk with your doctor before you increase the amount of fluids you drink. · Take an over-the-counter pain medicine, such as acetaminophen (Tylenol), ibuprofen (Advil, Motrin), or naproxen (Aleve). Read and follow all instructions on the label. · Before you use cough and cold medicines, check the label. These medicines may not be safe for young children or for people with certain health problems. · Be careful when taking over-the-counter cold or flu medicines and Tylenol at the same time. Many of these medicines have acetaminophen, which is Tylenol. Read the labels to make sure that you are not taking more than the recommended dose.  Too much acetaminophen (Tylenol) can be harmful. · Get plenty of rest.  · Do not smoke or allow others to smoke around you. If you need help quitting, talk to your doctor about stop-smoking programs and medicines. These can increase your chances of quitting for good. When should you call for help? Call 911 anytime you think you may need emergency care. For example, call if:  ? · You have severe trouble breathing. ?Call your doctor now or seek immediate medical care if:  ? · You seem to be getting much sicker. ? · You have new or worse trouble breathing. ? · You have a new or higher fever. ? · You have a new rash. ? Watch closely for changes in your health, and be sure to contact your doctor if:  ? · You have a new symptom, such as a sore throat, an earache, or sinus pain. ? · You cough more deeply or more often, especially if you notice more mucus or a change in the color of your mucus. ? · You do not get better as expected. Where can you learn more? Go to http://tavon-kareen.info/. Enter L093 in the search box to learn more about \"Upper Respiratory Infection (Cold): Care Instructions. \"  Current as of: May 12, 2017  Content Version: 11.4  © 4502-2697 [employer]. Care instructions adapted under license by [employer] (which disclaims liability or warranty for this information). If you have questions about a medical condition or this instruction, always ask your healthcare professional. [place] [address] any warranty or liability for your use of this information.

## 2018-03-28 NOTE — PROGRESS NOTES
HISTORY OF PRESENT ILLNESS  Barry Bloom is a 47 y.o. female who has had a cough for 6 consecutive days and it has not improved. She went to an urgent care on the second day of symptoms, and at that point it was a mildly severe cough and intermittent. It was occasionally productive with thin yellow sputum. She has asthma and she states that often when she gets an upper respiratory infection that it causes the asthma to activate and eventually she gets bronchitis. She is also had pneumonia in the past.  At the urgent care she was given Tessalon Perles, Tussionex, and Zithromax. She states she finished the Zithromax and cough without any better and seems like it is actually more frequent and she goes into a \"coughing fit\". The Tussionex helps but the Tessalon Perles did not help at all. Cough   The history is provided by the patient. This is a new problem. The current episode started more than 2 days ago. The problem occurs daily. The problem has been gradually worsening. Pertinent negatives include no chest pain and no shortness of breath. Associated symptoms comments: Mild chest tightness and soreness. The symptoms are aggravated by exertion (talking). Past Medical History:   Diagnosis Date    Asthma     Cerebral palsy ([address]     Cervical strain     GERD (gastroesophageal reflux disease)     Hyperlipidemia     Hypertension     Ovarian cyst     Sinusitis     with cough     Social History: non smoker    Visit Vitals    BP (!) 177/115    Pulse 87    Temp 98.2 °F (36.8 °C) (Oral)    Resp 16    Ht 5' 7.01\" (1.702 m)    Wt 262 lb (118.8 kg)    SpO2 98%    BMI 41.03 kg/m2       Review of Systems   Constitutional: Negative for chills, fever and malaise/fatigue. HENT: Positive for congestion. Negative for sore throat. Respiratory: Positive for cough. Negative for shortness of breath. Cardiovascular: Negative for chest pain.        Physical Exam   Constitutional: She appears well-developed and well-nourished. No distress. HENT:   Right Ear: External ear normal.   Left Ear: External ear normal.   Nose: Nose normal.   Mouth/Throat: Oropharynx is clear and moist.   Neck: Neck supple. Cardiovascular: Normal rate, regular rhythm and normal heart sounds. Pulmonary/Chest: Effort normal and breath sounds normal. She has no wheezes. She has no rales. Lymphadenopathy:     She has no cervical adenopathy. ASSESSMENT and PLAN    ICD-10-CM ICD-9-CM    1. Viral upper respiratory tract infection J06.9 465.9 chlorpheniramine-HYDROcodone (TUSSIONEX) 10-8 mg/5 mL suspension    B97.89  methylPREDNISolone (MEDROL DOSEPACK) 4 mg tablet      fluticasone-salmeterol (ADVAIR) 250-50 mcg/dose diskus inhaler     Patient presents with symptoms of a viral upper respiratory infection with a predominant cough. She has a little bit less than half of the Tussionex and since it does help without causing her to feel real drowsy, she is given a scription to fill if needed when her current bottle runs out. She is started on a Medrol Dosepak to hopefully help any inflammation of the bronchioles for asthma. She is given a refill for her Advair as requested. She is instructed to the office if there is not gradual improvement and 2-3 days. If she worsens and/or develops fever then she is to return to center. She verbalized understanding and agreed with plan.

## 2018-03-30 ENCOUNTER — TELEPHONE (OUTPATIENT)
Dept: FAMILY MEDICINE CLINIC | Age: 53
End: 2018-03-30

## 2018-03-30 DIAGNOSIS — J06.9 UPPER RESPIRATORY TRACT INFECTION, UNSPECIFIED TYPE: Primary | ICD-10-CM

## 2018-03-30 RX ORDER — CEFUROXIME AXETIL 500 MG/1
500 TABLET ORAL 2 TIMES DAILY
Qty: 20 TAB | Refills: 0 | Status: SHIPPED | OUTPATIENT
Start: 2018-03-30 | End: 2018-04-09

## 2018-03-30 NOTE — TELEPHONE ENCOUNTER
Pt is calling to let Ms. Jairo Jordan she is not feeling better. She is asking for antibiotics. Please advise.

## 2018-04-04 ENCOUNTER — PATIENT OUTREACH (OUTPATIENT)
Dept: FAMILY MEDICINE CLINIC | Age: 53
End: 2018-04-04

## 2018-04-04 NOTE — PROGRESS NOTES
NN health screening:    I have opened another episode of care regarding this patients health screenings and I have enlisted the help of [facility] office staff to discuss with her during her April 16 appt. Need to verify if she had total hysterectomy or partial to decide if her cervical screenings are indeed necessary anymore and to f/u on CRC screening as she has had the FiT kit @ home since her OV last year. Mammogram is up to date in .

## 2018-04-16 ENCOUNTER — OFFICE VISIT (OUTPATIENT)
Dept: FAMILY MEDICINE CLINIC | Age: 53
End: 2018-04-16

## 2018-04-16 ENCOUNTER — HOSPITAL ENCOUNTER (OUTPATIENT)
Dept: LAB | Age: 53
Discharge: HOME | End: 2018-04-16
Payer: COMMERCIAL

## 2018-04-16 VITALS
OXYGEN SATURATION: 98 % | BODY MASS INDEX: 41.59 KG/M2 | WEIGHT: 265 LBS | HEART RATE: 78 BPM | DIASTOLIC BLOOD PRESSURE: 92 MMHG | SYSTOLIC BLOOD PRESSURE: 140 MMHG | HEIGHT: 67 IN | RESPIRATION RATE: 17 BRPM

## 2018-04-16 DIAGNOSIS — I10 ESSENTIAL HYPERTENSION: ICD-10-CM

## 2018-04-16 DIAGNOSIS — E66.01 SEVERE OBESITY (BMI >= 40) (HCC): ICD-10-CM

## 2018-04-16 DIAGNOSIS — E78.5 HYPERLIPIDEMIA, UNSPECIFIED HYPERLIPIDEMIA TYPE: ICD-10-CM

## 2018-04-16 DIAGNOSIS — Z00.00 ROUTINE GENERAL MEDICAL EXAMINATION AT A HEALTH CARE FACILITY: Primary | ICD-10-CM

## 2018-04-16 DIAGNOSIS — J45.30 MILD PERSISTENT ASTHMA WITHOUT COMPLICATION: ICD-10-CM

## 2018-04-16 DIAGNOSIS — Z12.11 SCREEN FOR COLON CANCER: ICD-10-CM

## 2018-04-16 DIAGNOSIS — G80.9 CEREBRAL PALSY, UNSPECIFIED TYPE (HCC): ICD-10-CM

## 2018-04-16 DIAGNOSIS — J30.9 CHRONIC ALLERGIC RHINITIS: ICD-10-CM

## 2018-04-16 LAB
ALBUMIN SERPL-MCNC: 3.7 G/DL (ref 3.4–5)
ALBUMIN/GLOB SERPL: 1.2 {RATIO} (ref 0.8–1.7)
ALP SERPL-CCNC: 135 U/L (ref 45–117)
ALT SERPL-CCNC: 40 U/L (ref 13–56)
ANION GAP SERPL CALC-SCNC: 11 MMOL/L (ref 3–18)
APPEARANCE UR: CLEAR
AST SERPL-CCNC: 28 U/L (ref 15–37)
BASOPHILS # BLD: 0 K/UL (ref 0–0.06)
BASOPHILS NFR BLD: 1 % (ref 0–2)
BILIRUB SERPL-MCNC: 0.6 MG/DL (ref 0.2–1)
BILIRUB UR QL: NEGATIVE
BUN SERPL-MCNC: 13 MG/DL (ref 7–18)
BUN/CREAT SERPL: 21 (ref 12–20)
CALCIUM SERPL-MCNC: 9.1 MG/DL (ref 8.5–10.1)
CHLORIDE SERPL-SCNC: 103 MMOL/L (ref 100–108)
CHOLEST SERPL-MCNC: 216 MG/DL
CO2 SERPL-SCNC: 26 MMOL/L (ref 21–32)
COLOR UR: YELLOW
CREAT SERPL-MCNC: 0.63 MG/DL (ref 0.6–1.3)
DIFFERENTIAL METHOD BLD: ABNORMAL
EOSINOPHIL # BLD: 0.1 K/UL (ref 0–0.4)
EOSINOPHIL NFR BLD: 2 % (ref 0–5)
ERYTHROCYTE [DISTWIDTH] IN BLOOD BY AUTOMATED COUNT: 13.4 % (ref 11.6–14.5)
EST. AVERAGE GLUCOSE BLD GHB EST-MCNC: 103 MG/DL
GLOBULIN SER CALC-MCNC: 3.2 G/DL (ref 2–4)
GLUCOSE SERPL-MCNC: 78 MG/DL (ref 74–99)
GLUCOSE UR STRIP.AUTO-MCNC: NEGATIVE MG/DL
HBA1C MFR BLD: 5.2 % (ref 4.2–5.6)
HCT VFR BLD AUTO: 42 % (ref 35–45)
HDLC SERPL-MCNC: 58 MG/DL (ref 40–60)
HDLC SERPL: 3.7 {RATIO} (ref 0–5)
HGB BLD-MCNC: 13.8 G/DL (ref 12–16)
HGB UR QL STRIP: NEGATIVE
KETONES UR QL STRIP.AUTO: NEGATIVE MG/DL
LDLC SERPL CALC-MCNC: 138.8 MG/DL (ref 0–100)
LEUKOCYTE ESTERASE UR QL STRIP.AUTO: NEGATIVE
LIPID PROFILE,FLP: ABNORMAL
LYMPHOCYTES # BLD: 1.7 K/UL (ref 0.9–3.6)
LYMPHOCYTES NFR BLD: 27 % (ref 21–52)
MCH RBC QN AUTO: 31.6 PG (ref 24–34)
MCHC RBC AUTO-ENTMCNC: 32.9 G/DL (ref 31–37)
MCV RBC AUTO: 96.1 FL (ref 74–97)
MONOCYTES # BLD: 0.6 K/UL (ref 0.05–1.2)
MONOCYTES NFR BLD: 9 % (ref 3–10)
NEUTS SEG # BLD: 3.9 K/UL (ref 1.8–8)
NEUTS SEG NFR BLD: 61 % (ref 40–73)
NITRITE UR QL STRIP.AUTO: NEGATIVE
PH UR STRIP: 8 [PH] (ref 5–8)
PLATELET # BLD AUTO: 271 K/UL (ref 135–420)
PMV BLD AUTO: 11.9 FL (ref 9.2–11.8)
POTASSIUM SERPL-SCNC: 3.8 MMOL/L (ref 3.5–5.5)
PROT SERPL-MCNC: 6.9 G/DL (ref 6.4–8.2)
PROT UR STRIP-MCNC: NEGATIVE MG/DL
RBC # BLD AUTO: 4.37 M/UL (ref 4.2–5.3)
SODIUM SERPL-SCNC: 140 MMOL/L (ref 136–145)
SP GR UR REFRACTOMETRY: 1.01 (ref 1–1.03)
TRIGL SERPL-MCNC: 96 MG/DL (ref ?–150)
TSH SERPL DL<=0.05 MIU/L-ACNC: 2.55 UIU/ML (ref 0.36–3.74)
UROBILINOGEN UR QL STRIP.AUTO: 0.2 EU/DL (ref 0.2–1)
VLDLC SERPL CALC-MCNC: 19.2 MG/DL
WBC # BLD AUTO: 6.2 K/UL (ref 4.6–13.2)

## 2018-04-16 PROCEDURE — 84443 ASSAY THYROID STIM HORMONE: CPT | Performed by: FAMILY MEDICINE

## 2018-04-16 PROCEDURE — 36415 COLL VENOUS BLD VENIPUNCTURE: CPT | Performed by: FAMILY MEDICINE

## 2018-04-16 PROCEDURE — 80053 COMPREHEN METABOLIC PANEL: CPT | Performed by: FAMILY MEDICINE

## 2018-04-16 PROCEDURE — 80061 LIPID PANEL: CPT | Performed by: FAMILY MEDICINE

## 2018-04-16 PROCEDURE — 85025 COMPLETE CBC W/AUTO DIFF WBC: CPT | Performed by: FAMILY MEDICINE

## 2018-04-16 PROCEDURE — 81003 URINALYSIS AUTO W/O SCOPE: CPT | Performed by: FAMILY MEDICINE

## 2018-04-16 PROCEDURE — 83036 HEMOGLOBIN GLYCOSYLATED A1C: CPT | Performed by: FAMILY MEDICINE

## 2018-04-16 RX ORDER — NAPROXEN 500 MG/1
500 TABLET ORAL
Qty: 30 TAB | Refills: 0 | Status: SHIPPED | OUTPATIENT
Start: 2018-04-16 | End: 2018-08-20 | Stop reason: SDUPTHER

## 2018-04-16 RX ORDER — EPINEPHRINE 0.3 MG/.3ML
0.3 INJECTION SUBCUTANEOUS
Qty: 1 SYRINGE | Refills: 1 | Status: SHIPPED | OUTPATIENT
Start: 2018-04-16 | End: 2019-04-11 | Stop reason: SDUPTHER

## 2018-04-16 NOTE — PROGRESS NOTES
SUBJECTIVE  Chief Complaint   Patient presents with    Hypertension    Asthma     HPI:     Her asthma is now controlled. No more wheezes at night. She is on Singulair. She is doing well as long as not exposed to extreme heat, fumes and dust.    Her BP is mildly elevated today; she feels it is due to stress with her father being terminally ill. She is following life style as tolerated for HTN,  lipids or obesity. Her back pain is stable  She takes naproxen about BIW, only on days she has to lift a lot at work. Past Medical History:   Diagnosis Date    Asthma     Cerebral palsy (HCC)     Cervical strain     GERD (gastroesophageal reflux disease)     Hyperlipidemia     Hypertension     Ovarian cyst     Sinusitis     with cough     Past Surgical History:   Procedure Laterality Date    HX CHOLECYSTECTOMY      HX HYSTERECTOMY      HX TUBAL LIGATION Bilateral         Current Outpatient Prescriptions   Medication Sig    fluticasone-salmeterol (ADVAIR) 250-50 mcg/dose diskus inhaler Take 1 Puff by inhalation every twelve (12) hours for 30 days.  benzonatate (TESSALON) 200 mg capsule Take 1 Cap by mouth three (3) times daily as needed.  naproxen (NAPROSYN) 500 mg tablet Take 1 Tab by mouth two (2) times daily as needed.  triamterene-hydroCHLOROthiazide (DYAZIDE) 37.5-25 mg per capsule TAKE ONE CAPSULE BY MOUTH ONCE DAILY    albuterol (PROVENTIL HFA, VENTOLIN HFA, PROAIR HFA) 90 mcg/actuation inhaler Take 2 Puffs by inhalation every four (4) hours as needed for Wheezing.  Nebulizer & Compressor machine Use as directed.  albuterol-ipratropium (DUO-NEB) 2.5 mg-0.5 mg/3 ml nebu 3 mL by Nebulization route four (4) times daily as needed.  montelukast (SINGULAIR) 10 mg tablet TAKE ONE TABLET BY MOUTH ONCE DAILY    fluticasone-salmeterol (ADVAIR) 500-50 mcg/dose diskus inhaler Take 1 Puff by inhalation every twelve (12) hours.  multivitamin (ONE A DAY) tablet Take 1 Tab by mouth daily.  Omega-3-DHA-EPA-Fish Oil 1,000 mg (120 mg-180 mg) cap Take  by mouth.  fluticasone (FLONASE) 50 mcg/actuation nasal spray 2 Sprays by Both Nostrils route daily.  EPINEPHrine (EPIPEN) 0.3 mg/0.3 mL injection 0.3 mL by IntraMUSCular route once as needed for up to 1 dose.  guaiFENesin (MUCINEX) 1,200 mg Ta12 ER tablet Take 1 Tab by mouth two (2) times a day. (Patient taking differently: Take 1,200 mg by mouth daily as needed.)     No current facility-administered medications for this visit. Allergies   Allergen Reactions    Latex Hives    Other Plant, Animal, Environmental Anaphylaxis     Wasp Sting    Prednisone Unable to Obtain     ROS:   Constitutional: No fever, chills, night sweats, dizziness. Ear/Nose/Throat: No ear/ throat/ sinus pain, lesions, unusual discharge, new speaking or hearing problems, nose bleed. Cardiovascular: No angina, palpitations, PND, orthopnea, lightheadedness, edema, claudication. Respiratory: No pleurisy, hemoptysis, unusual sputum. Gastrointestinal: No nausea/ vomiting, bowel habit change, pain, TRINO symptoms, melena, hematochezia, anorexia. Psychiatric: No agitation, confusion/disorientation, suicidal or homicidal ideation. Musculoskeletal: no significant joint swelling, instability, focal weakness. OBJECTIVE  Constitutional: General Appearance:  well developed, obese, nontoxic, in no acute distress. Visit Vitals    BP (!) 140/92 (BP 1 Location: Left arm, BP Patient Position: Sitting)    Pulse 78    Resp 17    Ht 5' 7\" (1.702 m)    Wt 265 lb (120.2 kg)    SpO2 98%    BMI 41.5 kg/m2     Otoscopic Examination: external auditory canals are clear; tympanic membranes are dull. Nasal Cavity: mildly swollen mucosa & turbinates. Maxillas are not tender w/o redness or heat. Throat: clear tonsils, oropharynx, posterior pharynx. Cardiovascular[de-identified] Palpation of Heart: PMI not displaced or enlarged, no thrills, no heaves.  Auscultation of Heart: RRR; No murmur, no rubs, no gallops. Neck: carotid arteries- normal volume & without bruit; no JVD. Extremities: no edema, no active varicosity. GI[de-identified] Normal bowel sounds. No masses; no tenderness; no rebound/rigidity; no CVA tenderness. No hepatosplenomegaly. Pulmonary[de-identified] Respiratory effort: normal; no dyspnea, no retractions, no accessory muscle use. Auscultation: normal & symmetrical air exchange; no rales, no rhonchi, no wheeze; no rubs    Psychiatric: No agitation, confusion/disorientation, suicidal or homicidal ideation. Musculoskeletal:   NC/AT. Neck-supple. Neurological[de-identified] CN I to XII: intact. DTR: symmetrical & normal. SLR- neg. Lab Results   Component Value Date/Time    Cholesterol, total 201 (H) 04/28/2017 08:47 AM    HDL Cholesterol 55 04/28/2017 08:47 AM    LDL, calculated 124.2 (H) 04/28/2017 08:47 AM    VLDL, calculated 21.8 04/28/2017 08:47 AM    Triglyceride 109 04/28/2017 08:47 AM    CHOL/HDL Ratio 3.7 04/28/2017 08:47 AM     Lab Results   Component Value Date/Time    WBC 5.1 04/28/2017 08:47 AM    HGB 14.3 04/28/2017 08:47 AM    HCT 44.3 04/28/2017 08:47 AM    PLATELET 000 89/36/8933 08:47 AM    MCV 96.7 04/28/2017 08:47 AM     Lab Results   Component Value Date/Time    Sodium 140 04/28/2017 08:47 AM    Potassium 3.6 04/28/2017 08:47 AM    Chloride 105 04/28/2017 08:47 AM    CO2 26 04/28/2017 08:47 AM    Anion gap 9 04/28/2017 08:47 AM    Glucose 85 04/28/2017 08:47 AM    BUN 13 04/28/2017 08:47 AM    Creatinine 0.60 04/28/2017 08:47 AM    BUN/Creatinine ratio 22 (H) 04/28/2017 08:47 AM    GFR est AA >60 04/28/2017 08:47 AM    GFR est non-AA >60 04/28/2017 08:47 AM    Calcium 9.7 04/28/2017 08:47 AM    Bilirubin, total 0.4 04/28/2017 08:47 AM    AST (SGOT) 30 04/28/2017 08:47 AM    Alk.  phosphatase 129 (H) 04/28/2017 08:47 AM    Protein, total 7.3 04/28/2017 08:47 AM    Albumin 4.0 04/28/2017 08:47 AM    Globulin 3.3 04/28/2017 08:47 AM    A-G Ratio 1.2 04/28/2017 08:47 AM    ALT (SGPT) 44 04/28/2017 08:47 AM     Lab Results   Component Value Date/Time    TSH 2.50 04/28/2017 08:47 AM       ASSESSMENT    1. Essential hypertension    2. Mild persistent asthma without complication    3. Hyperlipidemia, unspecified hyperlipidemia type    4. Chronic allergic rhinitis    5. Cerebral palsy, unspecified type ([facility] [address]    6. Severe obesity (BMI >= 40) ([facility]        PLAN    Orders Placed This Encounter    CBC WITH AUTOMATED DIFF    METABOLIC PANEL, COMPREHENSIVE    LIPID PANEL    TSH 3RD GENERATION    URINALYSIS W/ RFLX MICROSCOPIC    HEMOGLOBIN A1C WITH EAG    OCCULT BLOOD, IMMUNOASSAY (FIT)    naproxen (NAPROSYN) 500 mg tablet    EPINEPHrine (EPIPEN) 0.3 mg/0.3 mL injection     Pharmacologic Management: Medications reviewed with the patient. She wants to wait 3 months before adjusting BP meds. Plan of care (Diet and Exercise) discussed in details. Discussed risk/benefit & side effect of treatment. Discussed DDx, follow-up & work-up. Follow up in 3 mos, prn sooner. Asthma/ allergy reviewed. Low salt ADA diet, weightloss & graduated excecise. Health risk from non adherence discussed. Patent voiced understanding. HM: she has had Tdap and Pneumovax. Follow-up Disposition:  Return in about 3 months (around 7/16/2018).                           Gaviota Montemayor MD

## 2018-04-16 NOTE — PROGRESS NOTES
David Larson is a 47 y.o. female presents to office for physical        Health Maintenance items with a due date reviewed with patient:  Health Maintenance Due   Topic Date Due    Pneumococcal 19-64 Medium Risk (1 of 1 - PPSV23) 09/01/1985    DTaP/Tdap/Td series (1 - Tdap) 09/01/1987    FOBT Q 1 YEAR AGE 50-75  09/01/2016

## 2018-04-16 NOTE — PATIENT INSTRUCTIONS
Body Mass Index: Care Instructions  Your Care Instructions    Body mass index (BMI) can help you see if your weight is raising your risk for health problems. It uses a formula to compare how much you weigh with how tall you are. · A BMI lower than 18.5 is considered underweight. · A BMI between 18.5 and 24.9 is considered healthy. · A BMI between 25 and 29.9 is considered overweight. A BMI of 30 or higher is considered obese. If your BMI is in the normal range, it means that you have a lower risk for weight-related health problems. If your BMI is in the overweight or obese range, you may be at increased risk for weight-related health problems, such as high blood pressure, heart disease, stroke, arthritis or joint pain, and diabetes. If your BMI is in the underweight range, you may be at increased risk for health problems such as fatigue, lower protection (immunity) against illness, muscle loss, bone loss, hair loss, and hormone problems. BMI is just one measure of your risk for weight-related health problems. You may be at higher risk for health problems if you are not active, you eat an unhealthy diet, or you drink too much alcohol or use tobacco products. Follow-up care is a key part of your treatment and safety. Be sure to make and go to all appointments, and call your doctor if you are having problems. It's also a good idea to know your test results and keep a list of the medicines you take. How can you care for yourself at home? · Practice healthy eating habits. This includes eating plenty of fruits, vegetables, whole grains, lean protein, and low-fat dairy. · If your doctor recommends it, get more exercise. Walking is a good choice. Bit by bit, increase the amount you walk every day. Try for at least 30 minutes on most days of the week. · Do not smoke. Smoking can increase your risk for health problems. If you need help quitting, talk to your doctor about stop-smoking programs and medicines. These can increase your chances of quitting for good. · Limit alcohol to 2 drinks a day for men and 1 drink a day for women. Too much alcohol can cause health problems. If you have a BMI higher than 25  · Your doctor may do other tests to check your risk for weight-related health problems. This may include measuring the distance around your waist. A waist measurement of more than 40 inches in men or 35 inches in women can increase the risk of weight-related health problems. · Talk with your doctor about steps you can take to stay healthy or improve your health. You may need to make lifestyle changes to lose weight and stay healthy, such as changing your diet and getting regular exercise. If you have a BMI lower than 18.5  · Your doctor may do other tests to check your risk for health problems. · Talk with your doctor about steps you can take to stay healthy or improve your health. You may need to make lifestyle changes to gain or maintain weight and stay healthy, such as getting more healthy foods in your diet and doing exercises to build muscle. Where can you learn more? Go to http://tavon-kareen.info/. Enter S176 in the search box to learn more about \"Body Mass Index: Care Instructions. \"  Current as of: October 13, 2016  Content Version: 11.4  © 9661-2017 [employer]. Care instructions adapted under license by [employer] (which disclaims liability or warranty for this information). If you have questions about a medical condition or this instruction, always ask your healthcare professional. [place] [address] any warranty or liability for your use of this information. Starting a Weight Loss Plan: Care Instructions  Your Care Instructions    If you are thinking about losing weight, it can be hard to know where to start. Your doctor can help you set up a weight loss plan that best meets your needs.  You may want to take a class on nutrition or exercise, or join a weight loss support group. If you have questions about how to make changes to your eating or exercise habits, ask your doctor about seeing a registered dietitian or an exercise specialist.  It can be a big challenge to lose weight. But you do not have to make huge changes at once. Make small changes, and stick with them. When those changes become habit, add a few more changes. If you do not think you are ready to make changes right now, try to pick a date in the future. Make an appointment to see your doctor to discuss whether the time is right for you to start a plan. Follow-up care is a key part of your treatment and safety. Be sure to make and go to all appointments, and call your doctor if you are having problems. It's also a good idea to know your test results and keep a list of the medicines you take. How can you care for yourself at home? · Set realistic goals. Many people expect to lose much more weight than is likely. A weight loss of 5% to 10% of your body weight may be enough to improve your health. · Get family and friends involved to provide support. Talk to them about why you are trying to lose weight, and ask them to help. They can help by participating in exercise and having meals with you, even if they may be eating something different. · Find what works best for you. If you do not have time or do not like to cook, a program that offers meal replacement bars or shakes may be better for you. Or if you like to prepare meals, finding a plan that includes daily menus and recipes may be best.  · Ask your doctor about other health professionals who can help you achieve your weight loss goals. ¨ A dietitian can help you make healthy changes in your diet.   ¨ An exercise specialist or  can help you develop a safe and effective exercise program.  ¨ A counselor or psychiatrist can help you cope with issues such as depression, anxiety, or family problems that can make it hard to focus on weight loss. · Consider joining a support group for people who are trying to lose weight. Your doctor can suggest groups in your area. Where can you learn more? Go to http://tavon-kareen.info/. Enter S120 in the search box to learn more about \"Starting a Weight Loss Plan: Care Instructions. \"  Current as of: October 13, 2016  Content Version: 11.4  © 4669-5070 [employer]. Care instructions adapted under license by [employer] (which disclaims liability or warranty for this information). If you have questions about a medical condition or this instruction, always ask your healthcare professional. [address] any warranty or liability for your use of this information.

## 2018-04-17 ENCOUNTER — PATIENT OUTREACH (OUTPATIENT)
Dept: FAMILY MEDICINE CLINIC | Age: 53
End: 2018-04-17

## 2018-04-17 NOTE — PROGRESS NOTES
LDL cholesterol was a little higher. The patient is advised to continue with diet and exercise. Otherwise lab work was stable. Will follow.

## 2018-04-17 NOTE — PROGRESS NOTES
NN health screening:    Noted another fit kit was given and order placed yesterday during her OV with Dr. Thais Gonzales. Will continue to follow.

## 2018-04-19 ENCOUNTER — TELEPHONE (OUTPATIENT)
Dept: FAMILY MEDICINE CLINIC | Age: 53
End: 2018-04-19

## 2018-04-19 NOTE — TELEPHONE ENCOUNTER
----- Message from Car Logan MD sent at 4/16/2018  9:34 PM EDT -----  LDL cholesterol was a little higher. The patient is advised to continue with diet and exercise. Otherwise lab work was stable. Will follow.

## 2018-06-25 ENCOUNTER — PATIENT OUTREACH (OUTPATIENT)
Dept: FAMILY MEDICINE CLINIC | Age: 53
End: 2018-06-25

## 2018-08-20 NOTE — TELEPHONE ENCOUNTER
Requested Prescriptions     Pending Prescriptions Disp Refills    naproxen (NAPROSYN) 500 mg tablet 30 Tab 0     Sig: Take 1 Tab by mouth two (2) times daily as needed.  triamterene-hydroCHLOROthiazide (DYAZIDE) 37.5-25 mg per capsule 90 Cap 3     Sig: TAKE ONE CAPSULE BY MOUTH [address]imee [address] [phone]    They have 0 tablets Naprosyn remaining. 10 B/P tablets remaining. Pts last appt was 4/16/18, no future appts are scheduled. Pt aware of 72 hours time frame.

## 2018-08-21 RX ORDER — TRIAMTERENE AND HYDROCHLOROTHIAZIDE 37.5; 25 MG/1; MG/1
CAPSULE ORAL
Qty: 90 CAP | Refills: 3 | Status: SHIPPED | OUTPATIENT
Start: 2018-08-21 | End: 2018-08-28 | Stop reason: SDUPTHER

## 2018-08-21 RX ORDER — NAPROXEN 500 MG/1
500 TABLET ORAL
Qty: 30 TAB | Refills: 0 | Status: SHIPPED | OUTPATIENT
Start: 2018-08-21 | End: 2018-11-13 | Stop reason: SDUPTHER

## 2018-08-21 NOTE — TELEPHONE ENCOUNTER
The patient was advised to follow-up in 3 months on 4/16/2018. So, the follow-up is overdue. Please contact the patient and make an appointment now.

## 2018-08-28 ENCOUNTER — OFFICE VISIT (OUTPATIENT)
Dept: FAMILY MEDICINE CLINIC | Age: 53
End: 2018-08-28

## 2018-08-28 ENCOUNTER — HOSPITAL ENCOUNTER (OUTPATIENT)
Dept: LAB | Age: 53
Discharge: HOME | End: 2018-08-28
Payer: COMMERCIAL

## 2018-08-28 VITALS
DIASTOLIC BLOOD PRESSURE: 88 MMHG | SYSTOLIC BLOOD PRESSURE: 124 MMHG | TEMPERATURE: 97.9 F | OXYGEN SATURATION: 97 % | WEIGHT: 257 LBS | BODY MASS INDEX: 40.34 KG/M2 | RESPIRATION RATE: 16 BRPM | HEART RATE: 67 BPM | HEIGHT: 67 IN

## 2018-08-28 DIAGNOSIS — J45.40 MODERATE PERSISTENT ASTHMA WITHOUT COMPLICATION: ICD-10-CM

## 2018-08-28 DIAGNOSIS — R07.81 RIB PAIN ON LEFT SIDE: ICD-10-CM

## 2018-08-28 DIAGNOSIS — I10 ESSENTIAL HYPERTENSION: Primary | ICD-10-CM

## 2018-08-28 DIAGNOSIS — Z12.11 SCREEN FOR COLON CANCER: ICD-10-CM

## 2018-08-28 DIAGNOSIS — Z00.00 ROUTINE GENERAL MEDICAL EXAMINATION AT A HEALTH CARE FACILITY: ICD-10-CM

## 2018-08-28 PROCEDURE — 82274 ASSAY TEST FOR BLOOD FECAL: CPT | Performed by: FAMILY MEDICINE

## 2018-08-28 RX ORDER — TRIAMTERENE AND HYDROCHLOROTHIAZIDE 37.5; 25 MG/1; MG/1
CAPSULE ORAL
Qty: 90 CAP | Refills: 3 | Status: SHIPPED | OUTPATIENT
Start: 2018-08-28 | End: 2019-07-30 | Stop reason: SDUPTHER

## 2018-08-28 RX ORDER — ALBUTEROL SULFATE 90 UG/1
2 AEROSOL, METERED RESPIRATORY (INHALATION)
Qty: 1 INHALER | Refills: 0 | Status: SHIPPED | OUTPATIENT
Start: 2018-08-28 | End: 2018-12-18 | Stop reason: SDUPTHER

## 2018-08-28 RX ORDER — FLUTICASONE PROPIONATE AND SALMETEROL 500; 50 UG/1; UG/1
1 POWDER RESPIRATORY (INHALATION) EVERY 12 HOURS
Qty: 3 INHALER | Refills: 1 | Status: SHIPPED | OUTPATIENT
Start: 2018-08-28 | End: 2020-02-10 | Stop reason: SDUPTHER

## 2018-08-28 NOTE — PROGRESS NOTES
SUBJECTIVE Chief Complaint Patient presents with  Asthma  Hypertension  Rib Pain HPI:  
 
Her asthma is now controlled. No more wheezes at night. She is on Singulair. She is doing well as long as not exposed to extreme heat, fumes and dust. 
 
Her BP is better today; she feels it is due to stress with her father being terminally ill. She is following life style as tolerated for HTN,  lipids or obesity. She went to an  a week ago because she fell on a wooden bar from a dog running into her. She was told to have Left sieded bruised ribs and sinusutis. She was given antibiotic for sinusitis. No specific treatment was offered for her ribs. She is better now. Her back pain is stable  She takes naproxen about BIW, only on days she has to lift a lot at work. Past Medical History:  
Diagnosis Date  Asthma  Cerebral palsy ([address]  Cervical strain  GERD (gastroesophageal reflux disease)  Hyperlipidemia  Hypertension  Ovarian cyst   
 Sinusitis   
 with cough Past Surgical History:  
Procedure Laterality Date  HX CHOLECYSTECTOMY  HX HYSTERECTOMY  HX TUBAL LIGATION Bilateral   
 
  
Current Outpatient Prescriptions Medication Sig  
 naproxen (NAPROSYN) 500 mg tablet Take 1 Tab by mouth two (2) times daily as needed.  triamterene-hydroCHLOROthiazide (DYAZIDE) 37.5-25 mg per capsule TAKE ONE CAPSULE BY MOUTH ONCE DAILY  EPINEPHrine (EPIPEN) 0.3 mg/0.3 mL injection 0.3 mL by IntraMUSCular route once as needed for up to 1 dose.  benzonatate (TESSALON) 200 mg capsule Take 1 Cap by mouth three (3) times daily as needed.  albuterol (PROVENTIL HFA, VENTOLIN HFA, PROAIR HFA) 90 mcg/actuation inhaler Take 2 Puffs by inhalation every four (4) hours as needed for Wheezing.  Nebulizer & Compressor machine Use as directed.  albuterol-ipratropium (DUO-NEB) 2.5 mg-0.5 mg/3 ml nebu 3 mL by Nebulization route four (4) times daily as needed.  montelukast (SINGULAIR) 10 mg tablet TAKE ONE TABLET BY MOUTH ONCE DAILY  fluticasone-salmeterol (ADVAIR) 500-50 mcg/dose diskus inhaler Take 1 Puff by inhalation every twelve (12) hours.  multivitamin (ONE A DAY) tablet Take 1 Tab by mouth daily.  Omega-3-DHA-EPA-Fish Oil 1,000 mg (120 mg-180 mg) cap Take  by mouth.  guaiFENesin (MUCINEX) 1,200 mg Ta12 ER tablet Take 1 Tab by mouth two (2) times a day. (Patient taking differently: Take 1,200 mg by mouth daily as needed.)  fluticasone (FLONASE) 50 mcg/actuation nasal spray 2 Sprays by Both Nostrils route daily. No current facility-administered medications for this visit. Allergies Allergen Reactions  Latex Hives  Other Plant, Animal, Environmental Anaphylaxis Wasp Sting  Prednisone Unable to Obtain ROS:  
Constitutional: No fever, chills, night sweats, dizziness. Ear/Nose/Throat: No ear/ throat/ sinus pain, lesions, unusual discharge, new speaking or hearing problems, nose bleed. Cardiovascular: No angina, palpitations, PND, orthopnea, lightheadedness, edema, claudication. Respiratory: No pleurisy, hemoptysis, unusual sputum. Gastrointestinal: No nausea/ vomiting, bowel habit change, pain, TRINO symptoms, melena, hematochezia, anorexia. Psychiatric: No agitation, confusion/disorientation, suicidal or homicidal ideation. Musculoskeletal: no significant joint swelling, instability, focal weakness. OBJECTIVE Constitutional: General Appearance:  well developed, obese, nontoxic, in no acute distress. Visit Vitals  /88 (BP 1 Location: Left arm, BP Patient Position: Sitting)  Pulse 67  Temp 97.9 °F (36.6 °C) (Oral)  Resp 16  
 Ht 5' 7\" (1.702 m)  Wt 257 lb (116.6 kg)  SpO2 97%  BMI 40.25 kg/m2 Otoscopic Examination: external auditory canals are clear; tympanic membranes are dull. Nasal Cavity: mildly swollen mucosa & turbinates. Maxillas are not tender w/o redness or heat. Throat: clear tonsils, oropharynx, posterior pharynx. Cardiovascular[de-identified] Palpation of Heart: PMI not displaced or enlarged, no thrills, no heaves. Auscultation of Heart: RRR; No murmur, no rubs, no gallops. Neck: carotid arteries- normal volume & without bruit; no JVD. Extremities: no edema, no active varicosity. GI[de-identified] Normal bowel sounds. No masses; no tenderness; no rebound/rigidity; no CVA tenderness. No hepatosplenomegaly. Pulmonary[de-identified] Respiratory effort: normal; no dyspnea, no retractions, no accessory muscle use. Auscultation: normal & symmetrical air exchange; no rales, no rhonchi, no wheeze; no rubs Psychiatric: No agitation, confusion/disorientation, suicidal or homicidal ideation. Musculoskeletal:  
NC/AT. Neck-supple. Left posterior chest wall mildly tender to palpation, no heat, no induration, no redness, no deformity. Neurological[de-identified] CN I to XII: intact. DTR: symmetrical & normal. SLR- neg. Lab Results Component Value Date/Time Cholesterol, total 216 (H) 04/16/2018 08:31 AM  
 HDL Cholesterol 58 04/16/2018 08:31 AM  
 LDL, calculated 138.8 (H) 04/16/2018 08:31 AM  
 VLDL, calculated 19.2 04/16/2018 08:31 AM  
 Triglyceride 96 04/16/2018 08:31 AM  
 CHOL/HDL Ratio 3.7 04/16/2018 08:31 AM  
 
Lab Results Component Value Date/Time WBC 6.2 04/16/2018 08:31 AM  
 HGB 13.8 04/16/2018 08:31 AM  
 HCT 42.0 04/16/2018 08:31 AM  
 PLATELET 663 87/56/0849 08:31 AM  
 MCV 96.1 04/16/2018 08:31 AM  
 
Lab Results Component Value Date/Time  Sodium 140 04/16/2018 08:31 AM  
 Potassium 3.8 04/16/2018 08:31 AM  
 Chloride 103 04/16/2018 08:31 AM  
 CO2 26 04/16/2018 08:31 AM  
 Anion gap 11 04/16/2018 08:31 AM  
 Glucose 78 04/16/2018 08:31 AM  
 BUN 13 04/16/2018 08:31 AM  
 Creatinine 0.63 04/16/2018 08:31 AM  
 BUN/Creatinine ratio 21 (H) 04/16/2018 08:31 AM  
 GFR est AA >60 04/16/2018 08:31 AM  
 GFR est non-AA >60 04/16/2018 08:31 AM  
 Calcium 9.1 04/16/2018 08:31 AM  
 Bilirubin, total 0.6 04/16/2018 08:31 AM  
 AST (SGOT) 28 04/16/2018 08:31 AM  
 Alk. phosphatase 135 (H) 04/16/2018 08:31 AM  
 Protein, total 6.9 04/16/2018 08:31 AM  
 Albumin 3.7 04/16/2018 08:31 AM  
 Globulin 3.2 04/16/2018 08:31 AM  
 A-G Ratio 1.2 04/16/2018 08:31 AM  
 ALT (SGPT) 40 04/16/2018 08:31 AM  
 
Lab Results Component Value Date/Time TSH 2.55 04/16/2018 08:31 AM  
 
 
ASSESSMENT 1. Essential hypertension 2. Moderate persistent asthma without complication 3. Rib pain on left side PLAN Orders Placed This Encounter  triamterene-hydroCHLOROthiazide (DYAZIDE) 37.5-25 mg per capsule  albuterol (PROVENTIL HFA, VENTOLIN HFA, PROAIR HFA) 90 mcg/actuation inhaler  fluticasone-salmeterol (ADVAIR) 500-50 mcg/dose diskus inhaler Pharmacologic Management: Medications reviewed with the patient. No change. Plan of care (Diet and Exercise) discussed in details. Warm compress and posterior care for the ribs. Discussed risk/benefit & side effect of treatment. Discussed DDx, follow-up & work-up. Follow up in 3 mos, prn sooner. Asthma/ allergy reviewed. Low salt ADA diet, weightloss & graduated excecise. Health risk from non adherence discussed. Patent voiced understanding. HM: she has had Tdap and Pneumovax. She is advised to bring her records. Follow-up Disposition: 
Return in about 3 months (around 11/28/2018) for fasting.  
                       
Franklyn Pantoja MD

## 2018-08-28 NOTE — PROGRESS NOTES
Debbie Candelario is a 47 y.o. female who is here to f/u on her HTN and medication refill. VS as documented.

## 2018-08-31 LAB — HEMOCCULT STL QL IA: POSITIVE

## 2018-09-03 DIAGNOSIS — R19.5 POSITIVE FIT (FECAL IMMUNOCHEMICAL TEST): Primary | ICD-10-CM

## 2018-09-04 ENCOUNTER — TELEPHONE (OUTPATIENT)
Dept: FAMILY MEDICINE CLINIC | Age: 53
End: 2018-09-04

## 2018-09-04 NOTE — TELEPHONE ENCOUNTER
----- Message from Morgan Ramirez MD sent at 9/3/2018 10:00 PM EDT -----  Stool occult blood was positive. Need to referral to GI for further work up.

## 2018-09-17 ENCOUNTER — PATIENT OUTREACH (OUTPATIENT)
Dept: FAMILY MEDICINE CLINIC | Age: 53
End: 2018-09-17

## 2018-10-09 ENCOUNTER — PATIENT OUTREACH (OUTPATIENT)
Dept: FAMILY MEDICINE CLINIC | Age: 53
End: 2018-10-09

## 2018-10-09 NOTE — PROGRESS NOTES
health screening:    Dr. Carlyle Vela office confirms colonoscopy was completed today with results pending.

## 2018-11-13 ENCOUNTER — OFFICE VISIT (OUTPATIENT)
Dept: FAMILY MEDICINE CLINIC | Age: 53
End: 2018-11-13

## 2018-11-13 VITALS
HEART RATE: 71 BPM | HEIGHT: 67 IN | SYSTOLIC BLOOD PRESSURE: 157 MMHG | DIASTOLIC BLOOD PRESSURE: 95 MMHG | RESPIRATION RATE: 17 BRPM | OXYGEN SATURATION: 98 % | WEIGHT: 264 LBS | BODY MASS INDEX: 41.44 KG/M2 | TEMPERATURE: 97.6 F

## 2018-11-13 DIAGNOSIS — J45.40 MODERATE PERSISTENT ASTHMA WITHOUT COMPLICATION: ICD-10-CM

## 2018-11-13 DIAGNOSIS — J30.9 CHRONIC ALLERGIC RHINITIS: ICD-10-CM

## 2018-11-13 DIAGNOSIS — J20.9 ACUTE BRONCHITIS, UNSPECIFIED ORGANISM: Primary | ICD-10-CM

## 2018-11-13 DIAGNOSIS — M54.5 CHRONIC MIDLINE LOW BACK PAIN, WITH SCIATICA PRESENCE UNSPECIFIED: ICD-10-CM

## 2018-11-13 DIAGNOSIS — G89.29 CHRONIC MIDLINE LOW BACK PAIN, WITH SCIATICA PRESENCE UNSPECIFIED: ICD-10-CM

## 2018-11-13 RX ORDER — HYDROCODONE POLISTIREX AND CHLORPHENIRAMINE POLISTIREX 10; 8 MG/5ML; MG/5ML
5 SUSPENSION, EXTENDED RELEASE ORAL
Qty: 60 ML | Refills: 0 | Status: SHIPPED | OUTPATIENT
Start: 2018-11-13 | End: 2019-04-11

## 2018-11-13 RX ORDER — FLUTICASONE PROPIONATE 50 MCG
2 SPRAY, SUSPENSION (ML) NASAL DAILY
Qty: 3 BOTTLE | Refills: 3 | Status: SHIPPED | OUTPATIENT
Start: 2018-11-13

## 2018-11-13 RX ORDER — MONTELUKAST SODIUM 10 MG/1
TABLET ORAL
Qty: 90 TAB | Refills: 3 | Status: SHIPPED | OUTPATIENT
Start: 2018-11-13 | End: 2019-10-09 | Stop reason: SDUPTHER

## 2018-11-13 RX ORDER — DOXYCYCLINE 100 MG/1
100 TABLET ORAL 2 TIMES DAILY
Qty: 20 TAB | Refills: 0 | Status: SHIPPED | OUTPATIENT
Start: 2018-11-13 | End: 2018-11-23

## 2018-11-13 RX ORDER — NAPROXEN 500 MG/1
500 TABLET ORAL
Qty: 30 TAB | Refills: 0 | Status: SHIPPED | OUTPATIENT
Start: 2018-11-13 | End: 2019-10-09

## 2018-11-13 RX ORDER — METHYLPREDNISOLONE 4 MG/1
TABLET ORAL
Qty: 1 DOSE PACK | Refills: 0 | Status: SHIPPED | OUTPATIENT
Start: 2018-11-13 | End: 2019-04-11

## 2018-11-13 NOTE — PROGRESS NOTES
Paris Kuo is a 47 y.o. female presents to office for cough for about 1 week    Medication list has been reviewed with Paris Kuo and updated as of today's date     Health Maintenance items with a due date reviewed with patient:  Health Maintenance Due   Topic Date Due    Pneumococcal 19-64 Medium Risk (1 of 1 - PPSV23) 09/01/1985    DTaP/Tdap/Td series (1 - Tdap) 09/01/1987    Shingrix Vaccine Age 50> (1 of 2) 09/01/2016

## 2018-11-14 ENCOUNTER — PATIENT OUTREACH (OUTPATIENT)
Dept: FAMILY MEDICINE CLINIC | Age: 53
End: 2018-11-14

## 2018-11-14 NOTE — PROGRESS NOTES
[facility] health screening:    Noted Ms Yolette Zavaleta recently diagnosed with acute bronchitis. Will delay my f/u on health screenings for a bit.

## 2018-11-14 NOTE — PROGRESS NOTES
[employer] [employer]     Chief Complaint   Patient presents with    Cough     Vitals:    11/13/18 1330   BP: (!) 157/95   Pulse: 71   Resp: 17   Temp: 97.6 °F (36.4 °C)   TempSrc: Oral   SpO2: 98%   Weight: 264 lb (119.7 kg)   Height: 5' 7\" (1.702 m)   PainSc:   6         HPI: Patient is here because of she has been coughing for 1 week, wheezing and cough is productive with sputum, there is no improvement despite using her asthma inhaler. Fever no chills no nausea vomiting no abdominal pain no chest pain but she does have occasional shortness of breath with cough spells. Thing other than her inhalers a steroid and albuterol    Is requesting refill or her montelukast, naproxen for low back pain and Flonase nasal spray for chronic allergic rhinitis. Past Medical History:   Diagnosis Date    Asthma     Cerebral palsy (HCC)     Cervical strain     GERD (gastroesophageal reflux disease)     Hyperlipidemia     Hypertension     Ovarian cyst     Sinusitis     with cough     Past Surgical History:   Procedure Laterality Date    HX CHOLECYSTECTOMY      HX HYSTERECTOMY      HX TUBAL LIGATION Bilateral      Social History     Tobacco Use    Smoking status: Never Smoker    Smokeless tobacco: Never Used   Substance Use Topics    Alcohol use: No     Alcohol/week: 0.0 oz       Family History   Problem Relation Age of Onset    Heart Disease Mother     Diabetes Father     Hypertension Father     Cancer Father         asbestosis    Asthma Sister        Review of Systems   Constitutional: Negative for chills, fever, malaise/fatigue and weight loss. HENT: Negative for congestion, ear discharge, ear pain, hearing loss, nosebleeds, sinus pain and sore throat. Eyes: Negative for blurred vision, double vision and discharge. Respiratory: Positive for cough, sputum production and wheezing. Negative for hemoptysis and shortness of breath.     Cardiovascular: Negative for chest pain, palpitations, claudication and leg swelling. Gastrointestinal: Negative for abdominal pain, constipation, diarrhea, nausea and vomiting. Genitourinary: Negative for dysuria, frequency and urgency. Musculoskeletal: Negative for back pain, joint pain, myalgias and neck pain. Skin: Negative for itching and rash. Neurological: Negative for dizziness, tingling, sensory change, speech change, focal weakness, weakness and headaches. Psychiatric/Behavioral: Negative for depression, substance abuse and suicidal ideas. Physical Exam   Constitutional: She is oriented to person, place, and time. She appears well-developed and well-nourished. No distress. HENT:   Head: Normocephalic and atraumatic. Mouth/Throat: Oropharynx is clear and moist. No oropharyngeal exudate. Eyes: Conjunctivae are normal. Pupils are equal, round, and reactive to light. Right eye exhibits no discharge. Left eye exhibits no discharge. No scleral icterus. Neck: Normal range of motion. Neck supple. No thyromegaly present. Cardiovascular: Normal rate, regular rhythm and normal heart sounds. No murmur heard. Pulmonary/Chest: Effort normal and breath sounds normal. No respiratory distress. She has no wheezes. She has no rales. She exhibits no tenderness. Abdominal: Soft. She exhibits no distension. There is no tenderness. There is no rebound. Musculoskeletal: Normal range of motion. She exhibits no edema, tenderness or deformity. Lymphadenopathy:     She has no cervical adenopathy. Neurological: She is alert and oriented to person, place, and time. No cranial nerve deficit. Coordination normal.   Skin: Skin is warm and dry. No rash noted. She is not diaphoretic. No erythema. No pallor. Psychiatric: She has a normal mood and affect. Her behavior is normal. Judgment and thought content normal.   Nursing note and vitals reviewed.        Assessment and plan     Today showed signs of bronchitis, patient will be started on antibiotic doxycycline for 10 days and Medrol Dosepak    Follow-up with her PCP in 1 week  1. Chronic allergic rhinitis    - fluticasone (FLONASE) 50 mcg/actuation nasal spray; 2 Sprays by Both Nostrils route daily. Dispense: 3 Bottle; Refill: 3    2. Moderate persistent asthma without complication    - montelukast (SINGULAIR) 10 mg tablet; TAKE ONE TABLET BY MOUTH ONCE DAILY  Dispense: 90 Tab; Refill: 3  - methylPREDNISolone (MEDROL DOSEPACK) 4 mg tablet; Take as directed  Dispense: 1 Dose Pack; Refill: 0    3. Acute bronchitis, unspecified organism    - XR CHEST PA LAT; Future  - methylPREDNISolone (MEDROL DOSEPACK) 4 mg tablet; Take as directed  Dispense: 1 Dose Pack; Refill: 0  - doxycycline (ADOXA) 100 mg tablet; Take 1 Tab by mouth two (2) times a day for 10 days. Dispense: 20 Tab; Refill: 0  - chlorpheniramine-HYDROcodone (TUSSIONEX) 10-8 mg/5 mL suspension; Take 5 mL by mouth every twelve (12) hours as needed for Cough. Max Daily Amount: 10 mL. Dispense: 60 mL; Refill: 0    4. Chronic midline low back pain, with sciatica presence unspecified    - naproxen (NAPROSYN) 500 mg tablet; Take 1 Tab by mouth two (2) times daily as needed. Dispense: 30 Tab; Refill: 0    Current Outpatient Medications   Medication Sig Dispense Refill    montelukast (SINGULAIR) 10 mg tablet TAKE ONE TABLET BY MOUTH ONCE DAILY 90 Tab 3    naproxen (NAPROSYN) 500 mg tablet Take 1 Tab by mouth two (2) times daily as needed. 30 Tab 0    fluticasone (FLONASE) 50 mcg/actuation nasal spray 2 Sprays by Both Nostrils route daily. 3 Bottle 3    methylPREDNISolone (MEDROL DOSEPACK) 4 mg tablet Take as directed 1 Dose Pack 0    doxycycline (ADOXA) 100 mg tablet Take 1 Tab by mouth two (2) times a day for 10 days. 20 Tab 0    chlorpheniramine-HYDROcodone (TUSSIONEX) 10-8 mg/5 mL suspension Take 5 mL by mouth every twelve (12) hours as needed for Cough. Max Daily Amount: 10 mL.  60 mL 0    triamterene-hydroCHLOROthiazide (DYAZIDE) 37.5-25 mg per capsule TAKE ONE CAPSULE BY MOUTH ONCE DAILY 90 Cap 3    albuterol (PROVENTIL HFA, VENTOLIN HFA, PROAIR HFA) 90 mcg/actuation inhaler Take 2 Puffs by inhalation every four (4) hours as needed for Wheezing. 1 Inhaler 0    fluticasone-salmeterol (ADVAIR) 500-50 mcg/dose diskus inhaler Take 1 Puff by inhalation every twelve (12) hours. 3 Inhaler 1    EPINEPHrine (EPIPEN) 0.3 mg/0.3 mL injection 0.3 mL by IntraMUSCular route once as needed for up to 1 dose. 1 Syringe 1    Nebulizer & Compressor machine Use as directed. 1 Each 0    albuterol-ipratropium (DUO-NEB) 2.5 mg-0.5 mg/3 ml nebu 3 mL by Nebulization route four (4) times daily as needed. 30 Nebule 0    multivitamin (ONE A DAY) tablet Take 1 Tab by mouth daily.  Omega-3-DHA-EPA-Fish Oil 1,000 mg (120 mg-180 mg) cap Take  by mouth.  guaiFENesin (MUCINEX) 1,200 mg Ta12 ER tablet Take 1 Tab by mouth two (2) times a day. (Patient taking differently: Take 1,200 mg by mouth daily as needed.) 30 Tab 0    benzonatate (TESSALON) 200 mg capsule Take 1 Cap by mouth three (3) times daily as needed.          Patient Active Problem List    Diagnosis Date Noted    Acute recurrent maxillary sinusitis 06/15/2017    Severe obesity (BMI >= 40) ([facility]) 04/28/2017    Chronic allergic rhinitis 10/12/2016    Hyperlipidemia 05/15/2015    Low back pain 03/12/2014    Sinusitis     Asthma     Hypertension     C[address] [address]      Results for orders placed or performed during the hospital encounter of 08/28/18   OCCULT BLOOD, IMMUNOASSAY (FIT)   Result Value Ref Range    Occult blood fecal, by IA Positive (A) [address] Outpatient Visit on 08/28/2018   Component Date Value Ref Range Status    Occult blood fecal, by IA 08/28/2018 Positive* NEGATIVE   Final    Comment: (NOTE)  Performed At: [address] [id number]  Jona Mayer MD AD:[id number]            Follow-up Disposition:  Return if symptoms worsen or fail to improve, for to follow up with .

## 2018-12-18 DIAGNOSIS — J45.40 MODERATE PERSISTENT ASTHMA WITHOUT COMPLICATION: ICD-10-CM

## 2018-12-18 NOTE — TELEPHONE ENCOUNTER
Pt calling to request medication refill of:  Requested Prescriptions     Pending Prescriptions Disp Refills    albuterol (PROVENTIL HFA, VENTOLIN HFA, PROAIR HFA) 90 mcg/actuation inhaler 1 Inhaler 0     Sig: Take 2 Puffs by inhalation every four (4) hours as needed for Wheezing. be sent to [facility]   Pt has about 30 puffs remaining. Pts last appt was 11/13/18  Advised pt of 72 hour time frame for refill requests. Please advise.

## 2018-12-19 RX ORDER — ALBUTEROL SULFATE 90 UG/1
2 AEROSOL, METERED RESPIRATORY (INHALATION)
Qty: 1 INHALER | Refills: 0 | Status: SHIPPED | OUTPATIENT
Start: 2018-12-19 | End: 2020-01-27 | Stop reason: SDUPTHER

## 2019-01-03 ENCOUNTER — PATIENT OUTREACH (OUTPATIENT)
Dept: FAMILY MEDICINE CLINIC | Age: 54
End: 2019-01-03

## 2019-01-03 NOTE — PROGRESS NOTES
[facility] health screening:    Colonoscopy report done in October is now on chart. I've asked office staff to update HM with report. Surveillance re: 5 yrs. Closed this episode of care.

## 2019-02-12 ENCOUNTER — OFFICE VISIT (OUTPATIENT)
Dept: FAMILY MEDICINE CLINIC | Age: 54
End: 2019-02-12

## 2019-02-12 VITALS
RESPIRATION RATE: 17 BRPM | OXYGEN SATURATION: 99 % | HEIGHT: 67 IN | BODY MASS INDEX: 41.12 KG/M2 | HEART RATE: 64 BPM | DIASTOLIC BLOOD PRESSURE: 90 MMHG | WEIGHT: 262 LBS | TEMPERATURE: 96.4 F | SYSTOLIC BLOOD PRESSURE: 142 MMHG

## 2019-02-12 DIAGNOSIS — I10 ESSENTIAL HYPERTENSION: ICD-10-CM

## 2019-02-12 DIAGNOSIS — E66.01 SEVERE OBESITY (BMI >= 40) (HCC): ICD-10-CM

## 2019-02-12 DIAGNOSIS — J45.41 MODERATE PERSISTENT ASTHMA WITH ACUTE EXACERBATION: Primary | ICD-10-CM

## 2019-02-12 DIAGNOSIS — J30.9 CHRONIC ALLERGIC RHINITIS: ICD-10-CM

## 2019-02-12 DIAGNOSIS — G80.9 CEREBRAL PALSY, UNSPECIFIED TYPE (HCC): ICD-10-CM

## 2019-02-12 NOTE — PROGRESS NOTES
Chary Pardo is a 47 y.o. female presents to office for htn Medication list has been reviewed with Chary Pardo and updated as of today's date Health Maintenance items with a due date reviewed with patient: 
Health Maintenance Due Topic Date Due  Pneumococcal 19-64 Medium Risk (1 of 1 - PPSV23) 09/01/1985  
 DTaP/Tdap/Td series (1 - Tdap) 09/01/1987  Shingrix Vaccine Age 50> (1 of 2) 09/01/2016

## 2019-02-12 NOTE — PROGRESS NOTES
SUBJECTIVE Chief Complaint Patient presents with  Hypertension  Asthma HPI:  
 
She went to an  3 days ago because of exacerbation of asthma, mostly nonproductive coughing. She was given Medrol dose pack. She is much better now. No more wheezes at night. She is on Singulair and Advair. She does well as long as not exposed to extreme cold/ heat, fumes and dust. 
 
Her BP is being treated; she feels it is high today because of the stress of having the asthma exacerbation. She is following life style as tolerated for HTN,  lipids or obesity. She has mild static cerebral palsy since birth. She is is functioning well. Her back pain is stable  She takes naproxen about BIW, only on days she has to lift a lot at work. Past Medical History:  
Diagnosis Date  Asthma  Cerebral palsy ([facility] [address]  Cervical strain  GERD (gastroesophageal reflux disease)  Hyperlipidemia  Hypertension  Ovarian cyst   
 Sinusitis   
 with cough Past Surgical History:  
Procedure Laterality Date  HX CHOLECYSTECTOMY  HX HYSTERECTOMY  HX TUBAL LIGATION Bilateral   
 
  
Current Outpatient Medications Medication Sig  
 albuterol (PROVENTIL HFA, VENTOLIN HFA, PROAIR HFA) 90 mcg/actuation inhaler Take 2 Puffs by inhalation every four (4) hours as needed for Wheezing.  montelukast (SINGULAIR) 10 mg tablet TAKE ONE TABLET BY MOUTH ONCE DAILY  naproxen (NAPROSYN) 500 mg tablet Take 1 Tab by mouth two (2) times daily as needed.  fluticasone (FLONASE) 50 mcg/actuation nasal spray 2 Sprays by Both Nostrils route daily.  methylPREDNISolone (MEDROL DOSEPACK) 4 mg tablet Take as directed  chlorpheniramine-HYDROcodone (TUSSIONEX) 10-8 mg/5 mL suspension Take 5 mL by mouth every twelve (12) hours as needed for Cough. Max Daily Amount: 10 mL.  triamterene-hydroCHLOROthiazide (DYAZIDE) 37.5-25 mg per capsule TAKE ONE CAPSULE BY MOUTH ONCE DAILY  fluticasone-salmeterol (ADVAIR) 500-50 mcg/dose diskus inhaler Take 1 Puff by inhalation every twelve (12) hours.  EPINEPHrine (EPIPEN) 0.3 mg/0.3 mL injection 0.3 mL by IntraMUSCular route once as needed for up to 1 dose.  Nebulizer & Compressor machine Use as directed.  albuterol-ipratropium (DUO-NEB) 2.5 mg-0.5 mg/3 ml nebu 3 mL by Nebulization route four (4) times daily as needed.  Omega-3-DHA-EPA-Fish Oil 1,000 mg (120 mg-180 mg) cap Take  by mouth.  guaiFENesin (MUCINEX) 1,200 mg Ta12 ER tablet Take 1 Tab by mouth two (2) times a day. (Patient taking differently: Take 1,200 mg by mouth daily as needed.)  multivitamin (ONE A DAY) tablet Take 1 Tab by mouth daily. No current facility-administered medications for this visit. Allergies Allergen Reactions  Latex Hives  Other Plant, Animal, Environmental Anaphylaxis Wasp Sting  Prednisone Unable to Obtain ROS:  
Constitutional: No fever, chills, night sweats, dizziness. Ear/Nose/Throat: No ear/ throat/ sinus pain, lesions, unusual discharge, new speaking or hearing problems, nose bleed. Cardiovascular: No angina, palpitations, PND, orthopnea, lightheadedness, edema, claudication. Respiratory: No pleurisy, hemoptysis, unusual sputum. Gastrointestinal: No nausea/ vomiting, bowel habit change, pain, TRINO symptoms, melena, hematochezia, anorexia. Neurological: No seizures, numbness, dizziness, speech abnormality, incontinence. Psychiatric: No agitation, confusion/disorientation, suicidal or homicidal ideation. Musculoskeletal: no significant joint swelling, instability, focal weakness. OBJECTIVE Constitutional: General Appearance:  well developed, obese, nontoxic, in no acute distress. Visit Vitals /90 (BP 1 Location: Right arm, BP Patient Position: Sitting) Pulse 64 Temp 96.4 °F (35.8 °C) (Oral) Resp 17 Ht 5' 7\" (1.702 m) Wt 262 lb (118.8 kg) SpO2 99% BMI 41.04 kg/m² Otoscopic Examination: external auditory canals are clear; tympanic membranes are dull. Nasal Cavity: mildly swollen mucosa & turbinates. Maxillas are not tender w/o redness or heat. Throat: clear tonsils, oropharynx, posterior pharynx. Cardiovascular[de-identified] Palpation of Heart: PMI not displaced or enlarged, no thrills, no heaves. Auscultation of Heart: RRR; No murmur, no rubs, no gallops. Neck: carotid arteries- normal volume & without bruit; no JVD. Extremities: no edema, no active varicosity. GI[de-identified] Normal bowel sounds. No masses; no tenderness; no rebound/rigidity; no CVA tenderness. No hepatosplenomegaly. Pulmonary[de-identified] Respiratory effort: normal; no dyspnea, no retractions, no accessory muscle use. Auscultation: normal & symmetrical air exchange; no rales, no rhonchi, no wheeze; no rubs Psychiatric: No agitation, confusion/disorientation, suicidal or homicidal ideation. Musculoskeletal:  
NC/AT. Neck-supple. Neurological[de-identified] CN I to XII: intact. DTR: symmetrical & normal. SLR- neg. Lab Results Component Value Date/Time Cholesterol, total 216 (H) 04/16/2018 08:31 AM  
 HDL Cholesterol 58 04/16/2018 08:31 AM  
 LDL, calculated 138.8 (H) 04/16/2018 08:31 AM  
 VLDL, calculated 19.2 04/16/2018 08:31 AM  
 Triglyceride 96 04/16/2018 08:31 AM  
 CHOL/HDL Ratio 3.7 04/16/2018 08:31 AM  
 
Lab Results Component Value Date/Time WBC 6.2 04/16/2018 08:31 AM  
 HGB 13.8 04/16/2018 08:31 AM  
 HCT 42.0 04/16/2018 08:31 AM  
 PLATELET 474 49/57/2070 08:31 AM  
 MCV 96.1 04/16/2018 08:31 AM  
 
Lab Results Component Value Date/Time  Sodium 140 04/16/2018 08:31 AM  
 Potassium 3.8 04/16/2018 08:31 AM  
 Chloride 103 04/16/2018 08:31 AM  
 CO2 26 04/16/2018 08:31 AM  
 Anion gap 11 04/16/2018 08:31 AM  
 Glucose 78 04/16/2018 08:31 AM  
 BUN 13 04/16/2018 08:31 AM  
 Creatinine 0.63 04/16/2018 08:31 AM  
 BUN/Creatinine ratio 21 (H) 04/16/2018 08:31 AM  
 GFR est AA >60 04/16/2018 08:31 AM  
 GFR est non-AA >60 04/16/2018 08:31 AM  
 Calcium 9.1 04/16/2018 08:31 AM  
 Bilirubin, total 0.6 04/16/2018 08:31 AM  
 AST (SGOT) 28 04/16/2018 08:31 AM  
 Alk. phosphatase 135 (H) 04/16/2018 08:31 AM  
 Protein, total 6.9 04/16/2018 08:31 AM  
 Albumin 3.7 04/16/2018 08:31 AM  
 Globulin 3.2 04/16/2018 08:31 AM  
 A-G Ratio 1.2 04/16/2018 08:31 AM  
 ALT (SGPT) 40 04/16/2018 08:31 AM  
 
Lab Results Component Value Date/Time TSH 2.55 04/16/2018 08:31 AM  
 
 
ASSESSMENT 1. Moderate persistent asthma with acute exacerbation 2. Essential hypertension 3. Chronic allergic rhinitis 4. Severe obesity (BMI >= 40) ([facility]) 5. Cerebral palsy, unspecified type ([address] Asthma now controlled. PLAN Discussed the patient's BMI with her. The BMI follow up plan is as follows:  
-dietary management education, guidance, and counseling 
-encourage exercise 
-monitor weight prescribed dietary intake 
-printed instructions in AVS. HM: She has had Pneumovax and Tdap at the pharmacy. She is advised to bring her records. Orders Placed This Encounter  varicella-zoster recombinant, PF, (SHINGRIX, PF,) 50 mcg/0.5 mL susr injection Pharmacologic Management: Medications reviewed with the patient. No change. Plan of care (Diet and Exercise) discussed in details. Discussed risk/benefit & side effect of treatment. Discussed DDx, follow-up & work-up. Follow up in 2 mos, prn sooner. Asthma/ allergy reviewed. Low salt ADA diet, weightloss & graduated excecise. Health risk from non adherence discussed. Patent voiced understanding. Follow-up Disposition: 
Return in about 2 months (around 4/12/2019) for fasting.  
                       
Jareth Zambrano MD

## 2019-02-12 NOTE — PATIENT INSTRUCTIONS
Body Mass Index: Care Instructions Your Care Instructions Body mass index (BMI) can help you see if your weight is raising your risk for health problems. It uses a formula to compare how much you weigh with how tall you are. · A BMI lower than 18.5 is considered underweight. · A BMI between 18.5 and 24.9 is considered healthy. · A BMI between 25 and 29.9 is considered overweight. A BMI of 30 or higher is considered obese. If your BMI is in the normal range, it means that you have a lower risk for weight-related health problems. If your BMI is in the overweight or obese range, you may be at increased risk for weight-related health problems, such as high blood pressure, heart disease, stroke, arthritis or joint pain, and diabetes. If your BMI is in the underweight range, you may be at increased risk for health problems such as fatigue, lower protection (immunity) against illness, muscle loss, bone loss, hair loss, and hormone problems. BMI is just one measure of your risk for weight-related health problems. You may be at higher risk for health problems if you are not active, you eat an unhealthy diet, or you drink too much alcohol or use tobacco products. Follow-up care is a key part of your treatment and safety. Be sure to make and go to all appointments, and call your doctor if you are having problems. It's also a good idea to know your test results and keep a list of the medicines you take. How can you care for yourself at home? · Practice healthy eating habits. This includes eating plenty of fruits, vegetables, whole grains, lean protein, and low-fat dairy. · If your doctor recommends it, get more exercise. Walking is a good choice. Bit by bit, increase the amount you walk every day. Try for at least 30 minutes on most days of the week. · Do not smoke. Smoking can increase your risk for health problems.  If you need help quitting, talk to your doctor about stop-smoking programs and medicines. These can increase your chances of quitting for good. · Limit alcohol to 2 drinks a day for men and 1 drink a day for women. Too much alcohol can cause health problems. If you have a BMI higher than 25 · Your doctor may do other tests to check your risk for weight-related health problems. This may include measuring the distance around your waist. A waist measurement of more than 40 inches in men or 35 inches in women can increase the risk of weight-related health problems. · Talk with your doctor about steps you can take to stay healthy or improve your health. You may need to make lifestyle changes to lose weight and stay healthy, such as changing your diet and getting regular exercise. If you have a BMI lower than 18.5 · Your doctor may do other tests to check your risk for health problems. · Talk with your doctor about steps you can take to stay healthy or improve your health. You may need to make lifestyle changes to gain or maintain weight and stay healthy, such as getting more healthy foods in your diet and doing exercises to build muscle. Where can you learn more? Go to http://tavon-kareen.info/. Enter S176 in the search box to learn more about \"Body Mass Index: Care Instructions. \" Current as of: October 13, 2016 Content Version: 11.4 © 0176-0834 [employer]. Care instructions adapted under license by [employer] (which disclaims liability or warranty for this information). If you have questions about a medical condition or this instruction, always ask your healthcare professional. [place] [address] any warranty or liability for your use of this information. Starting a Weight Loss Plan: Care Instructions Your Care Instructions If you are thinking about losing weight, it can be hard to know where to start.  Your doctor can help you set up a weight loss plan that best meets your needs. You may want to take a class on nutrition or exercise, or join a weight loss support group. If you have questions about how to make changes to your eating or exercise habits, ask your doctor about seeing a registered dietitian or an exercise specialist. 
It can be a big challenge to lose weight. But you do not have to make huge changes at once. Make small changes, and stick with them. When those changes become habit, add a few more changes. If you do not think you are ready to make changes right now, try to pick a date in the future. Make an appointment to see your doctor to discuss whether the time is right for you to start a plan. Follow-up care is a key part of your treatment and safety. Be sure to make and go to all appointments, and call your doctor if you are having problems. It's also a good idea to know your test results and keep a list of the medicines you take. How can you care for yourself at home? · Set realistic goals. Many people expect to lose much more weight than is likely. A weight loss of 5% to 10% of your body weight may be enough to improve your health. · Get family and friends involved to provide support. Talk to them about why you are trying to lose weight, and ask them to help. They can help by participating in exercise and having meals with you, even if they may be eating something different. · Find what works best for you. If you do not have time or do not like to cook, a program that offers meal replacement bars or shakes may be better for you. Or if you like to prepare meals, finding a plan that includes daily menus and recipes may be best. 
· Ask your doctor about other health professionals who can help you achieve your weight loss goals. ¨ A dietitian can help you make healthy changes in your diet.  
¨ An exercise specialist or  can help you develop a safe and effective exercise program. 
 ¨ A counselor or psychiatrist can help you cope with issues such as depression, anxiety, or family problems that can make it hard to focus on weight loss. · Consider joining a support group for people who are trying to lose weight. Your doctor can suggest groups in your area. Where can you learn more? Go to http://tavon-kareen.info/. Enter R317 in the search box to learn more about \"Starting a Weight Loss Plan: Care Instructions. \" Current as of: October 13, 2016 Content Version: 11.4 © 0573-5738 [employer]. Care instructions adapted under license by [employer] (which disclaims liability or warranty for this information). If you have questions about a medical condition or this instruction, always ask your healthcare professional. [address] any warranty or liability for your use of this information.

## 2019-04-11 ENCOUNTER — OFFICE VISIT (OUTPATIENT)
Dept: FAMILY MEDICINE CLINIC | Age: 54
End: 2019-04-11

## 2019-04-11 VITALS
DIASTOLIC BLOOD PRESSURE: 90 MMHG | HEART RATE: 68 BPM | WEIGHT: 262 LBS | RESPIRATION RATE: 17 BRPM | OXYGEN SATURATION: 100 % | SYSTOLIC BLOOD PRESSURE: 146 MMHG | HEIGHT: 67 IN | TEMPERATURE: 96.9 F | BODY MASS INDEX: 41.12 KG/M2

## 2019-04-11 DIAGNOSIS — G80.9 CEREBRAL PALSY, UNSPECIFIED TYPE (HCC): ICD-10-CM

## 2019-04-11 DIAGNOSIS — I10 ESSENTIAL HYPERTENSION: Primary | ICD-10-CM

## 2019-04-11 DIAGNOSIS — J30.9 CHRONIC ALLERGIC RHINITIS: ICD-10-CM

## 2019-04-11 DIAGNOSIS — E78.5 HYPERLIPIDEMIA, UNSPECIFIED HYPERLIPIDEMIA TYPE: ICD-10-CM

## 2019-04-11 DIAGNOSIS — J45.40 MODERATE PERSISTENT ASTHMA WITHOUT COMPLICATION: ICD-10-CM

## 2019-04-11 LAB
ANION GAP SERPL CALC-SCNC: 9.7 MMOL/L
BUN SERPL-MCNC: 13 MG/DL (ref 6–22)
CALCIUM SERPL-MCNC: 9.7 MG/DL (ref 8.4–10.5)
CHLORIDE SERPL-SCNC: 101 MMOL/L (ref 98–110)
CO2 SERPL-SCNC: 30 MMOL/L (ref 20–32)
CREAT SERPL-MCNC: 0.6 MG/DL (ref 0.5–1.2)
GFRAA, 66117: >60
GFRNA, 66118: >60
GLUCOSE SERPL-MCNC: 89 MG/DL (ref 70–99)
POTASSIUM SERPL-SCNC: 3.2 MMOL/L (ref 3.5–5.5)
SODIUM SERPL-SCNC: 141 MMOL/L (ref 133–145)

## 2019-04-11 RX ORDER — CYCLOBENZAPRINE HCL 10 MG
TABLET ORAL
COMMUNITY
End: 2019-07-11

## 2019-04-11 RX ORDER — EPINEPHRINE 0.3 MG/.3ML
0.3 INJECTION SUBCUTANEOUS
Qty: 1 SYRINGE | Refills: 1 | Status: SHIPPED | OUTPATIENT
Start: 2019-04-11 | End: 2019-04-11

## 2019-04-11 RX ORDER — AMLODIPINE BESYLATE 5 MG/1
5 TABLET ORAL DAILY
Qty: 90 TAB | Refills: 0 | Status: SHIPPED | OUTPATIENT
Start: 2019-04-11 | End: 2019-07-11 | Stop reason: SDUPTHER

## 2019-04-11 NOTE — PROGRESS NOTES
SUBJECTIVE  Chief Complaint   Patient presents with    Hypertension     HPI:     Her BP is being treated wo side effect of Rx. She has been on Naprosyn for last 3 weeks and her BP was high at the workman comp's office last week, as high as 551 systolic. Her asthma/ allergy is now controlled. She is following life style as tolerated for HTN,  lipids or obesity. She has mild static cerebral palsy since birth. She is is functioning well. Her back pain is stable  She takes naproxen about BIW, only on days she has to lift a lot at work. She is also taking PRN Flexeril from workman's comp after a fall at work and Lt shoulder contusion. She is getting better. .    Past Medical History:   Diagnosis Date    Asthma     Cerebral palsy (HCC)     Cervical strain     GERD (gastroesophageal reflux disease)     Hyperlipidemia     Hypertension     Ovarian cyst     Sinusitis     with cough     Past Surgical History:   Procedure Laterality Date    HX CHOLECYSTECTOMY      HX HYSTERECTOMY      HX TUBAL LIGATION Bilateral         Current Outpatient Medications   Medication Sig    cyclobenzaprine (FLEXERIL) 10 mg tablet Take  by mouth three (3) times daily as needed for Muscle Spasm(s).  albuterol (PROVENTIL HFA, VENTOLIN HFA, PROAIR HFA) 90 mcg/actuation inhaler Take 2 Puffs by inhalation every four (4) hours as needed for Wheezing.  montelukast (SINGULAIR) 10 mg tablet TAKE ONE TABLET BY MOUTH ONCE DAILY    naproxen (NAPROSYN) 500 mg tablet Take 1 Tab by mouth two (2) times daily as needed.  fluticasone (FLONASE) 50 mcg/actuation nasal spray 2 Sprays by Both Nostrils route daily.  triamterene-hydroCHLOROthiazide (DYAZIDE) 37.5-25 mg per capsule TAKE ONE CAPSULE BY MOUTH ONCE DAILY    fluticasone-salmeterol (ADVAIR) 500-50 mcg/dose diskus inhaler Take 1 Puff by inhalation every twelve (12) hours.     EPINEPHrine (EPIPEN) 0.3 mg/0.3 mL injection 0.3 mL by IntraMUSCular route once as needed for up to 1 dose.  Nebulizer & Compressor machine Use as directed.  albuterol-ipratropium (DUO-NEB) 2.5 mg-0.5 mg/3 ml nebu 3 mL by Nebulization route four (4) times daily as needed.  multivitamin (ONE A DAY) tablet Take 1 Tab by mouth daily.  Omega-3-DHA-EPA-Fish Oil 1,000 mg (120 mg-180 mg) cap Take  by mouth.  guaiFENesin (MUCINEX) 1,200 mg Ta12 ER tablet Take 1 Tab by mouth two (2) times a day. (Patient taking differently: Take 1,200 mg by mouth daily as needed.)     No current facility-administered medications for this visit. Allergies   Allergen Reactions    Latex Hives    Other Plant, Animal, Environmental Anaphylaxis     Wasp Sting    Prednisone Unable to Obtain     ROS:   Constitutional: No fever, chills, night sweats, dizziness. Ear/Nose/Throat: No ear/ throat/ sinus pain, lesions, unusual discharge, new speaking or hearing problems, nose bleed. Cardiovascular: No angina, palpitations, PND, orthopnea, lightheadedness, edema, claudication. Respiratory: No pleurisy, hemoptysis, unusual sputum. Gastrointestinal: No nausea/ vomiting, bowel habit change, pain, TRINO symptoms, melena, hematochezia, anorexia. Neurological: No seizures, numbness, dizziness, speech abnormality, incontinence. Psychiatric: No agitation, confusion/disorientation, suicidal or homicidal ideation. Musculoskeletal: no significant joint swelling, instability, focal weakness. OBJECTIVE  Constitutional: General Appearance:  well developed, obese, nontoxic, in no acute distress. Visit Vitals  /90 (BP 1 Location: Right arm, BP Patient Position: Sitting)   Pulse 68   Temp 96.9 °F (36.1 °C) (Oral)   Resp 17   Ht 5' 7\" (1.702 m)   Wt 262 lb (118.8 kg)   SpO2 100%   BMI 41.04 kg/m²     ENT: Otoscopic Examination: external auditory canals are clear; tympanic membranes are dull. Nasal Cavity: mildly swollen mucosa & turbinates. Maxillas are not tender w/o redness or heat.  Throat: clear tonsils, oropharynx, posterior pharynx. Cardiovascular[de-identified] Palpation of Heart: PMI not displaced or enlarged, no thrills, no heaves. Auscultation of Heart: RRR; No murmur, no rubs, no gallops. Neck: carotid arteries- normal volume & without bruit; no JVD. Extremities: no edema, no active varicosity. GI[de-identified] Normal bowel sounds. No masses; no tenderness; no rebound/rigidity; no CVA tenderness. No hepatosplenomegaly. Pulmonary[de-identified] Respiratory effort: normal; no dyspnea, no retractions, no accessory muscle use. Auscultation: normal & symmetrical air exchange; no rales, no rhonchi, no wheeze; no rubs    Psychiatric: No agitation, confusion/disorientation, suicidal or homicidal ideation. Musculoskeletal:   NC/AT. Neck-supple. Neurological[de-identified] CN I to XII: intact. DTR: symmetrical & normal. SLR- neg. Lab Results   Component Value Date/Time    Cholesterol, total 216 (H) 04/16/2018 08:31 AM    HDL Cholesterol 58 04/16/2018 08:31 AM    LDL, calculated 138.8 (H) 04/16/2018 08:31 AM    VLDL, calculated 19.2 04/16/2018 08:31 AM    Triglyceride 96 04/16/2018 08:31 AM    CHOL/HDL Ratio 3.7 04/16/2018 08:31 AM     Lab Results   Component Value Date/Time    WBC 6.2 04/16/2018 08:31 AM    HGB 13.8 04/16/2018 08:31 AM    HCT 42.0 04/16/2018 08:31 AM    PLATELET 399 29/48/0595 08:31 AM    MCV 96.1 04/16/2018 08:31 AM     Lab Results   Component Value Date/Time    Sodium 140 04/16/2018 08:31 AM    Potassium 3.8 04/16/2018 08:31 AM    Chloride 103 04/16/2018 08:31 AM    CO2 26 04/16/2018 08:31 AM    Anion gap 11 04/16/2018 08:31 AM    Glucose 78 04/16/2018 08:31 AM    BUN 13 04/16/2018 08:31 AM    Creatinine 0.63 04/16/2018 08:31 AM    BUN/Creatinine ratio 21 (H) 04/16/2018 08:31 AM    GFR est AA >60 04/16/2018 08:31 AM    GFR est non-AA >60 04/16/2018 08:31 AM    Calcium 9.1 04/16/2018 08:31 AM    Bilirubin, total 0.6 04/16/2018 08:31 AM    AST (SGOT) 28 04/16/2018 08:31 AM    Alk.  phosphatase 135 (H) 04/16/2018 08:31 AM Protein, total 6.9 04/16/2018 08:31 AM    Albumin 3.7 04/16/2018 08:31 AM    Globulin 3.2 04/16/2018 08:31 AM    A-G Ratio 1.2 04/16/2018 08:31 AM    ALT (SGPT) 40 04/16/2018 08:31 AM     Lab Results   Component Value Date/Time    TSH 2.55 04/16/2018 08:31 AM       ASSESSMENT    1. Essential hypertension    2. Moderate persistent asthma without complication    3. Chronic allergic rhinitis    4. Cerebral palsy, unspecified type ([address]    5. Hyperlipidemia, unspecified hyperlipidemia type       Asthma controlled. PLAN    HM: She has had Pneumovax and Tdap at the pharmacy. She is again advised to bring her records. Orders Placed This Encounter    METABOLIC PANEL, BASIC    cyclobenzaprine (FLEXERIL) 10 mg tablet    amLODIPine (NORVASC) 5 mg tablet    EPINEPHrine (EPIPEN) 0.3 mg/0.3 mL injection     Pharmacologic Management: Medications reviewed with the patient. Add amlodipine 5 mg daily. Consider discontinue Naprosyn. Discussed risk/benefit & side effect of treatment. Discussed DDx, follow-up & work-up. Follow up in 3 mos, prn sooner. Asthma/ allergy reviewed. BP to be [facility] at [facility] next week. Low salt ADA diet, weightloss & graduated excecise. Health risk from non adherence discussed. Patent voiced understanding. Follow-up and Dispositions    · Return in about 3 months (around 7/11/2019). She will be back in one month for physical.  Will check BP then also.                           Ketan Noble MD

## 2019-04-11 NOTE — PROGRESS NOTES
Anju Lopez is a 47 y.o. female presents to office for htn    Medication list has been reviewed with Anju Lopez and updated as of today's date     Health Maintenance items with a due date reviewed with patient:  Health Maintenance Due   Topic Date Due    Pneumococcal 0-64 years (1 of 1 - PPSV23) 09/01/1972    DTaP/Tdap/Td series (1 - Tdap) 09/01/1987    Shingrix Vaccine Age 50> (1 of 2) 09/01/2016    BREAST CANCER SCRN MAMMOGRAM  07/10/2019

## 2019-04-11 NOTE — PROGRESS NOTES
Potassium was a little low. Please eat potassium rich food and we will recheck at next visit. Otherwise the renal function was normal and same as in the past.  Will continue to follow.

## 2019-07-11 ENCOUNTER — OFFICE VISIT (OUTPATIENT)
Dept: FAMILY MEDICINE CLINIC | Age: 54
End: 2019-07-11

## 2019-07-11 ENCOUNTER — HOSPITAL ENCOUNTER (OUTPATIENT)
Dept: LAB | Age: 54
Discharge: HOME | End: 2019-07-11
Payer: COMMERCIAL

## 2019-07-11 VITALS
HEART RATE: 87 BPM | RESPIRATION RATE: 16 BRPM | DIASTOLIC BLOOD PRESSURE: 84 MMHG | OXYGEN SATURATION: 98 % | HEIGHT: 67 IN | TEMPERATURE: 96.5 F | BODY MASS INDEX: 40.59 KG/M2 | WEIGHT: 258.6 LBS | SYSTOLIC BLOOD PRESSURE: 124 MMHG

## 2019-07-11 DIAGNOSIS — G80.9 CEREBRAL PALSY, UNSPECIFIED TYPE (HCC): ICD-10-CM

## 2019-07-11 DIAGNOSIS — E78.5 HYPERLIPIDEMIA, UNSPECIFIED HYPERLIPIDEMIA TYPE: ICD-10-CM

## 2019-07-11 DIAGNOSIS — Z00.00 ROUTINE GENERAL MEDICAL EXAMINATION AT A HEALTH CARE FACILITY: Primary | ICD-10-CM

## 2019-07-11 DIAGNOSIS — E66.01 SEVERE OBESITY (BMI >= 40) (HCC): ICD-10-CM

## 2019-07-11 DIAGNOSIS — J45.40 MODERATE PERSISTENT ASTHMA WITHOUT COMPLICATION: ICD-10-CM

## 2019-07-11 DIAGNOSIS — Z00.00 ROUTINE GENERAL MEDICAL EXAMINATION AT A HEALTH CARE FACILITY: ICD-10-CM

## 2019-07-11 DIAGNOSIS — J30.9 CHRONIC ALLERGIC RHINITIS: ICD-10-CM

## 2019-07-11 DIAGNOSIS — I10 ESSENTIAL HYPERTENSION: ICD-10-CM

## 2019-07-11 LAB
ALBUMIN SERPL-MCNC: 4 G/DL (ref 3.4–5)
ALBUMIN/GLOB SERPL: 1.2 {RATIO} (ref 0.8–1.7)
ALP SERPL-CCNC: 159 U/L (ref 45–117)
ALT SERPL-CCNC: 36 U/L (ref 13–56)
ANION GAP SERPL CALC-SCNC: 8 MMOL/L (ref 3–18)
APPEARANCE UR: CLEAR
AST SERPL-CCNC: 23 U/L (ref 15–37)
BASOPHILS # BLD: 0 K/UL (ref 0–0.1)
BASOPHILS NFR BLD: 1 % (ref 0–2)
BILIRUB SERPL-MCNC: 0.5 MG/DL (ref 0.2–1)
BILIRUB UR QL: NEGATIVE
BUN SERPL-MCNC: 11 MG/DL (ref 7–18)
BUN/CREAT SERPL: 18 (ref 12–20)
CALCIUM SERPL-MCNC: 9.4 MG/DL (ref 8.5–10.1)
CHLORIDE SERPL-SCNC: 99 MMOL/L (ref 100–108)
CHOLEST SERPL-MCNC: 211 MG/DL
CO2 SERPL-SCNC: 31 MMOL/L (ref 21–32)
COLOR UR: YELLOW
CREAT SERPL-MCNC: 0.6 MG/DL (ref 0.6–1.3)
DIFFERENTIAL METHOD BLD: ABNORMAL
EOSINOPHIL # BLD: 0.1 K/UL (ref 0–0.4)
EOSINOPHIL NFR BLD: 1 % (ref 0–5)
ERYTHROCYTE [DISTWIDTH] IN BLOOD BY AUTOMATED COUNT: 13.2 % (ref 11.6–14.5)
EST. AVERAGE GLUCOSE BLD GHB EST-MCNC: 105 MG/DL
GLOBULIN SER CALC-MCNC: 3.3 G/DL (ref 2–4)
GLUCOSE SERPL-MCNC: 82 MG/DL (ref 74–99)
GLUCOSE UR STRIP.AUTO-MCNC: NEGATIVE MG/DL
HBA1C MFR BLD: 5.3 % (ref 4.2–5.6)
HCT VFR BLD AUTO: 43.6 % (ref 35–45)
HDLC SERPL-MCNC: 63 MG/DL (ref 40–60)
HDLC SERPL: 3.3 {RATIO} (ref 0–5)
HGB BLD-MCNC: 14.3 G/DL (ref 12–16)
HGB UR QL STRIP: NEGATIVE
KETONES UR QL STRIP.AUTO: NEGATIVE MG/DL
LDLC SERPL CALC-MCNC: 126.6 MG/DL (ref 0–100)
LEUKOCYTE ESTERASE UR QL STRIP.AUTO: NEGATIVE
LIPID PROFILE,FLP: ABNORMAL
LYMPHOCYTES # BLD: 1.7 K/UL (ref 0.9–3.6)
LYMPHOCYTES NFR BLD: 28 % (ref 21–52)
MCH RBC QN AUTO: 31 PG (ref 24–34)
MCHC RBC AUTO-ENTMCNC: 32.8 G/DL (ref 31–37)
MCV RBC AUTO: 94.6 FL (ref 74–97)
MONOCYTES # BLD: 0.5 K/UL (ref 0.05–1.2)
MONOCYTES NFR BLD: 9 % (ref 3–10)
NEUTS SEG # BLD: 3.8 K/UL (ref 1.8–8)
NEUTS SEG NFR BLD: 61 % (ref 40–73)
NITRITE UR QL STRIP.AUTO: NEGATIVE
PH UR STRIP: 8.5 [PH] (ref 5–8)
PLATELET # BLD AUTO: 283 K/UL (ref 135–420)
PMV BLD AUTO: 12 FL (ref 9.2–11.8)
POTASSIUM SERPL-SCNC: 3.6 MMOL/L (ref 3.5–5.5)
PROT SERPL-MCNC: 7.3 G/DL (ref 6.4–8.2)
PROT UR STRIP-MCNC: NEGATIVE MG/DL
RBC # BLD AUTO: 4.61 M/UL (ref 4.2–5.3)
SODIUM SERPL-SCNC: 138 MMOL/L (ref 136–145)
SP GR UR REFRACTOMETRY: 1.01 (ref 1–1.03)
TRIGL SERPL-MCNC: 107 MG/DL (ref ?–150)
TSH SERPL DL<=0.05 MIU/L-ACNC: 2.19 UIU/ML (ref 0.36–3.74)
UROBILINOGEN UR QL STRIP.AUTO: 0.2 EU/DL (ref 0.2–1)
VLDLC SERPL CALC-MCNC: 21.4 MG/DL
WBC # BLD AUTO: 6.1 K/UL (ref 4.6–13.2)

## 2019-07-11 PROCEDURE — 85025 COMPLETE CBC W/AUTO DIFF WBC: CPT

## 2019-07-11 PROCEDURE — 81003 URINALYSIS AUTO W/O SCOPE: CPT

## 2019-07-11 PROCEDURE — 84443 ASSAY THYROID STIM HORMONE: CPT

## 2019-07-11 PROCEDURE — 80061 LIPID PANEL: CPT

## 2019-07-11 PROCEDURE — 83036 HEMOGLOBIN GLYCOSYLATED A1C: CPT

## 2019-07-11 PROCEDURE — 80053 COMPREHEN METABOLIC PANEL: CPT

## 2019-07-11 PROCEDURE — 36415 COLL VENOUS BLD VENIPUNCTURE: CPT

## 2019-07-11 RX ORDER — AMLODIPINE BESYLATE 5 MG/1
5 TABLET ORAL DAILY
Qty: 90 TAB | Refills: 3 | Status: SHIPPED | OUTPATIENT
Start: 2019-07-11 | End: 2020-07-09 | Stop reason: SDUPTHER

## 2019-07-11 NOTE — PROGRESS NOTES
SUBJECTIVE  Chief Complaint   Patient presents with    Physical    Follow-up     f/u Asthma, Hypertension, hyperlipidemia allergic rhinitis     HPI:     She is here for annual physical.  Her chronic problems are controlled. She does not have any acute complaint. Her asthma and chronic rhinitis are now controlled. She is doing well as long as not exposed to extreme heat, fumes and dust, or she does not develop respiratory infection. Her BP is controlled with amlodipine and Dyazide. She denies any side effect. She is following life style as tolerated for HTN,  lipids or obesity. Her back pain is stable  She takes naproxen about BIW, only on days she has to lift a lot at work. No other Systemic, Cardiovascular or Respiratory symptoms. Past Medical History:   Diagnosis Date    Asthma     Cerebral palsy ([address]     Cervical strain     GERD (gastroesophageal reflux disease)     Hyperlipidemia     Hypertension     Ovarian cyst     Sinusitis     with cough     Past Surgical History:   Procedure Laterality Date    HX CHOLECYSTECTOMY      HX HYSTERECTOMY      HX TUBAL LIGATION Bilateral         Current Outpatient Medications   Medication Sig    varicella-zoster recombinant, PF, (SHINGRIX, PF,) 50 mcg/0.5 mL susr injection 0.5 mL by IntraMUSCular route once for 1 dose.  amLODIPine (NORVASC) 5 mg tablet Take 1 Tab by mouth daily.  albuterol (PROVENTIL HFA, VENTOLIN HFA, PROAIR HFA) 90 mcg/actuation inhaler Take 2 Puffs by inhalation every four (4) hours as needed for Wheezing.  montelukast (SINGULAIR) 10 mg tablet TAKE ONE TABLET BY MOUTH ONCE DAILY    naproxen (NAPROSYN) 500 mg tablet Take 1 Tab by mouth two (2) times daily as needed.  fluticasone (FLONASE) 50 mcg/actuation nasal spray 2 Sprays by Both Nostrils route daily.     triamterene-hydroCHLOROthiazide (DYAZIDE) 37.5-25 mg per capsule TAKE ONE CAPSULE BY MOUTH ONCE DAILY    fluticasone-salmeterol (ADVAIR) 500-50 mcg/dose diskus inhaler Take 1 Puff by inhalation every twelve (12) hours.  Nebulizer & Compressor machine Use as directed.  albuterol-ipratropium (DUO-NEB) 2.5 mg-0.5 mg/3 ml nebu 3 mL by Nebulization route four (4) times daily as needed.  multivitamin (ONE A DAY) tablet Take 1 Tab by mouth daily.  Omega-3-DHA-EPA-Fish Oil 1,000 mg (120 mg-180 mg) cap Take  by mouth.  guaiFENesin (MUCINEX) 1,200 mg Ta12 ER tablet Take 1 Tab by mouth two (2) times a day. (Patient taking differently: Take 1,200 mg by mouth daily as needed.)     No current facility-administered medications for this visit. Allergies   Allergen Reactions    Latex Hives    Other Plant, Animal, Environmental Anaphylaxis     Wasp Sting    Prednisone Unable to Obtain     ROS:   Constitutional: No fever, chills, night sweats, dizziness. Ear/Nose/Throat: No ear/ throat/ sinus pain, lesions, unusual discharge, new speaking or hearing problems, nose bleed. Eyes: No eye discomfort, discharge, redness, new visual changes. Cardiovascular: No angina, palpitations, PND, orthopnea, lightheadedness, edema, claudication. Respiratory: No pleurisy, hemoptysis, unusual sputum. Gastrointestinal: No nausea/ vomiting, bowel habit change, pain, TRINO symptoms, melena, hematochezia, anorexia. Genitourinary: No dysuria, urgency, frequency, nocturia, hematuria, hesitancy, incontinence. Gyn: She is status post hysterectomy. No vaginal unusual bleeding/ discharge/ discomfort, no pelvic pain, dysmenorrhea. Psychiatric: No agitation, confusion/disorientation, suicidal or homicidal ideation. Musculoskeletal: no significant joint swelling, instability, focal weakness. Endocrine: Chronic obesity, otherwise no complaints. Allergy: Controlled asthma/chronic allergic rhinitis. Neurological: Mild static cerebral palsy. No seizures, numbness, dizziness, speech abnormality, incontinence.     Social History     Tobacco Use    Smoking status: Never Smoker  Smokeless tobacco: Never Used   Substance Use Topics    Alcohol use: No     Alcohol/week: 0.0 oz    Drug use: No     Family History   Problem Relation Age of Onset    Heart Disease Mother     Diabetes Father     Hypertension Father     Cancer Father         asbestosis    Asthma Sister      OBJECTIVE  Constitutional: General Appearance:  well developed, obese, nontoxic, in no acute distress. Visit Vitals  /84 (BP 1 Location: Left arm, BP Patient Position: Sitting)   Pulse 87   Temp 96.5 °F (35.8 °C) (Temporal)   Resp 16   Ht 5' 7\" (1.702 m)   Wt 258 lb 9.6 oz (117.3 kg)   SpO2 98%   BMI 40.50 kg/m²     Otoscopic Examination: external auditory canals are clear; tympanic membranes are dull. Nasal Cavity: mildly swollen mucosa & turbinates. Maxillas are not tender w/o redness or heat. Throat: clear tonsils, oropharynx, posterior pharynx. Eyes[de-identified] Conjunctiva: normal & Lids: normal.  Pupils & Irises: normal size; equal, round and reactive to light. Neck Area[de-identified] Neck: without masses, symmetric, trachea is midline. Thyroid:  without significant enlargement, masses or tenderness. Nodes[de-identified] Cervical: no significant adenopathy. Inguinal: no significant adenopathy. Skin: No acute rash. Cardiovascular[de-identified] Palpation of Heart: PMI not displaced or enlarged, no thrills, no heaves. Auscultation of Heart: RRR; No murmur, no rubs, no gallops. Neck: carotid arteries- normal volume & without bruit; no JVD. Extremities: no edema, no active varicosity. Femoral Arteries: normal volume. No bruit. Pedal Pulses: normal volume. GI[de-identified] Normal bowel sounds. No masses; no tenderness; no rebound/rigidity; no CVA tenderness. No hepatosplenomegaly. Pulmonary[de-identified] Respiratory effort: normal; no dyspnea, no retractions, no accessory muscle use. Auscultation: normal & symmetrical air exchange; no rales, no rhonchi, no wheeze; no rubs    Psychiatric[de-identified] Oriented to time, place and person.    Normal mood, no agitation or anxiety. Normal affect. Pleasant and cooperative. Musculoskeletal:  Gait and station: Normal  NC/AT. Neck-supple. Extremities: No heat, effusion, redness, tenderness. Normal ROM. No instability. Normal strength & tone. Neurological[de-identified] CN I to XII: intact. DTR: symmetrical & normal. SLR- neg. Lab Results   Component Value Date/Time    Cholesterol, total 216 (H) 04/16/2018 08:31 AM    HDL Cholesterol 58 04/16/2018 08:31 AM    LDL, calculated 138.8 (H) 04/16/2018 08:31 AM    VLDL, calculated 19.2 04/16/2018 08:31 AM    Triglyceride 96 04/16/2018 08:31 AM    CHOL/HDL Ratio 3.7 04/16/2018 08:31 AM     Lab Results   Component Value Date/Time    WBC 6.2 04/16/2018 08:31 AM    HGB 13.8 04/16/2018 08:31 AM    HCT 42.0 04/16/2018 08:31 AM    PLATELET 958 14/88/7133 08:31 AM    MCV 96.1 04/16/2018 08:31 AM     Lab Results   Component Value Date/Time    Sodium 141 04/11/2019 09:54 AM    Potassium 3.2 (L) 04/11/2019 09:54 AM    Chloride 101 04/11/2019 09:54 AM    CO2 30 04/11/2019 09:54 AM    Anion gap 9.7 04/11/2019 09:54 AM    Glucose 89 04/11/2019 09:54 AM    BUN 13 04/11/2019 09:54 AM    Creatinine 0.6 04/11/2019 09:54 AM    BUN/Creatinine ratio 21 (H) 04/16/2018 08:31 AM    GFR est AA >60 04/16/2018 08:31 AM    GFR est non-AA >60 04/16/2018 08:31 AM    Calcium 9.7 04/11/2019 09:54 AM    Bilirubin, total 0.6 04/16/2018 08:31 AM    AST (SGOT) 28 04/16/2018 08:31 AM    Alk. phosphatase 135 (H) 04/16/2018 08:31 AM    Protein, total 6.9 04/16/2018 08:31 AM    Albumin 3.7 04/16/2018 08:31 AM    Globulin 3.2 04/16/2018 08:31 AM    A-G Ratio 1.2 04/16/2018 08:31 AM    ALT (SGPT) 40 04/16/2018 08:31 AM     Lab Results   Component Value Date/Time    TSH 2.55 04/16/2018 08:31 AM       ASSESSMENT    1. Routine general medical examination at a health care facility    2. Essential hypertension    3. Moderate persistent asthma without complication    4. Chronic allergic rhinitis    5.  Hyperlipidemia, unspecified hyperlipidemia type    6. Severe obesity (BMI >= 40) ([facility])    7. Cerebral palsy, unspecified type ([facility] [address]        PLAN    Orders Placed This Encounter    CBC WITH AUTOMATED DIFF    METABOLIC PANEL, COMPREHENSIVE    LIPID PANEL    TSH 3RD GENERATION    HEMOGLOBIN A1C WITH EAG    URINALYSIS W/ RFLX MICROSCOPIC    varicella-zoster recombinant, PF, (SHINGRIX, PF,) 50 mcg/0.5 mL susr injection    amLODIPine (NORVASC) 5 mg tablet     Pharmacologic Management: Medications reviewed with the patient. No change today. Plan of care (Diet and Exercise) discussed in details. Discussed risk/benefit & side effect of treatment. Discussed DDx, follow-up & work-up. Asthma/ allergy reviewed. Low salt ADA diet, weightloss & graduated excecise. Discussed nutrition, education, accident prevention and anticipatory guidance. Follow up visit as planned, prn sooner. Health risk from non adherence discussed. Patent voiced understanding. HM: she has had Tdap and Pneumovax. Shingles immunization prescription written again. Follow-up and Dispositions    · Return in about 3 months (around 10/11/2019).                                Nataly Valenzuela MD

## 2019-07-12 NOTE — PROGRESS NOTES
LDL cholesterol is a little better than a year ago; still high. The patient is advised to continue with diet and exercise as tolerated. Alkaline phosphatase was a little high- nonspecific. Will check further in 3 months. Otherwise lab work was stable- good.

## 2019-07-30 DIAGNOSIS — I10 ESSENTIAL HYPERTENSION: ICD-10-CM

## 2019-07-30 RX ORDER — TRIAMTERENE AND HYDROCHLOROTHIAZIDE 37.5; 25 MG/1; MG/1
CAPSULE ORAL
Qty: 90 CAP | Refills: 3 | Status: SHIPPED | OUTPATIENT
Start: 2019-07-30 | End: 2020-07-24

## 2019-07-30 NOTE — TELEPHONE ENCOUNTER
Pt calling to request med refill of:  Requested Prescriptions     Pending Prescriptions Disp Refills    triamterene-hydroCHLOROthiazide (DYAZIDE) 37.5-25 mg per capsule 90 Cap 3     Sig: TAKE ONE CAPSULE BY MOUTH ONCE DAILY     Be sent to [email or web address]    Pt has 7 tabs remaining     Pts last appt was 7/11/19  & next appt alisa for 10/10/19    Pt advised of 72 hour time frame. Please advise.

## 2019-08-02 ENCOUNTER — TELEPHONE (OUTPATIENT)
Dept: FAMILY MEDICINE CLINIC | Age: 54
End: 2019-08-02

## 2019-08-02 NOTE — TELEPHONE ENCOUNTER
Patient returned call. I discussed lab results with patient. I answered questions and concerns that patient had. Closing encounter.

## 2019-08-02 NOTE — TELEPHONE ENCOUNTER
----- Message from Prerna Yoon MD sent at 7/12/2019  3:07 PM EDT -----  LDL cholesterol is a little better than a year ago; still high. The patient is advised to continue with diet and exercise as tolerated. Alkaline phosphatase was a little high- nonspecific. Will check further in 3 months. Otherwise lab work was stable- good.

## 2019-10-09 ENCOUNTER — OFFICE VISIT (OUTPATIENT)
Dept: FAMILY MEDICINE CLINIC | Age: 54
End: 2019-10-09

## 2019-10-09 VITALS
BODY MASS INDEX: 39.55 KG/M2 | OXYGEN SATURATION: 96 % | SYSTOLIC BLOOD PRESSURE: 128 MMHG | TEMPERATURE: 96.1 F | HEIGHT: 67 IN | RESPIRATION RATE: 18 BRPM | DIASTOLIC BLOOD PRESSURE: 74 MMHG | HEART RATE: 76 BPM | WEIGHT: 252 LBS

## 2019-10-09 DIAGNOSIS — J45.40 MODERATE PERSISTENT ASTHMA WITHOUT COMPLICATION: ICD-10-CM

## 2019-10-09 DIAGNOSIS — M79.89 SWELLING OF CALF: ICD-10-CM

## 2019-10-09 DIAGNOSIS — M79.604 PAIN OF RIGHT LOWER EXTREMITY: Primary | ICD-10-CM

## 2019-10-09 RX ORDER — MONTELUKAST SODIUM 10 MG/1
TABLET ORAL
Qty: 90 TAB | Refills: 3 | Status: SHIPPED | OUTPATIENT
Start: 2019-10-09

## 2019-10-09 NOTE — PROGRESS NOTES
SUBJECTIVE  Chief Complaint   Patient presents with    Leg Pain     Rt for 2 weeks     HPI:     She has pain and swelling of Rt lateral calf area for 2 weeks, may be gradually getting worse. The lower leg swells up later in the day. This started after she took days off from work and was resting at home. No injury or trauma. Her asthma and chronic rhinitis are now controlled. She is doing well as long as not exposed to extreme heat, fumes and dust, or she does not develop respiratory infection. Her BP is controlled with amlodipine and Dyazide. She denies any side effect. Her back pain is stable      No other Systemic, Cardiovascular or Respiratory symptoms. Past Medical History:   Diagnosis Date    Asthma     Cerebral palsy ([facility])     Cervical strain     GERD (gastroesophageal reflux disease)     Hyperlipidemia     Hypertension     Ovarian cyst     Sinusitis     with cough     Past Surgical History:   Procedure Laterality Date    HX CHOLECYSTECTOMY      HX HYSTERECTOMY      HX TUBAL LIGATION Bilateral         Current Outpatient Medications   Medication Sig    triamterene-hydroCHLOROthiazide (DYAZIDE) 37.5-25 mg per capsule TAKE ONE CAPSULE BY MOUTH ONCE DAILY    amLODIPine (NORVASC) 5 mg tablet Take 1 Tab by mouth daily.  albuterol (PROVENTIL HFA, VENTOLIN HFA, PROAIR HFA) 90 mcg/actuation inhaler Take 2 Puffs by inhalation every four (4) hours as needed for Wheezing.  montelukast (SINGULAIR) 10 mg tablet TAKE ONE TABLET BY MOUTH ONCE DAILY    fluticasone (FLONASE) 50 mcg/actuation nasal spray 2 Sprays by Both Nostrils route daily.  fluticasone-salmeterol (ADVAIR) 500-50 mcg/dose diskus inhaler Take 1 Puff by inhalation every twelve (12) hours.  Nebulizer & Compressor machine Use as directed.  albuterol-ipratropium (DUO-NEB) 2.5 mg-0.5 mg/3 ml nebu 3 mL by Nebulization route four (4) times daily as needed.  multivitamin (ONE A DAY) tablet Take 1 Tab by mouth daily.     Omega-3-DHA-EPA-Fish Oil 1,000 mg (120 mg-180 mg) cap Take  by mouth.  guaiFENesin (MUCINEX) 1,200 mg Ta12 ER tablet Take 1 Tab by mouth two (2) times a day. (Patient taking differently: Take 1,200 mg by mouth daily as needed.)     No current facility-administered medications for this visit. Allergies   Allergen Reactions    Latex Hives    Other Plant, Animal, Environmental Anaphylaxis     Wasp Sting    Prednisone Unable to Obtain     ROS:   Constitutional: No fever, chills, night sweats, dizziness. Cardiovascular: No angina, palpitations, PND, orthopnea, lightheadedness, edema, claudication. Respiratory: Now no dyspnea, wheeze, pleurisy, hemoptysis, unusual cough or sputum. Gastrointestinal: No nausea/ vomiting, bowel habit change, pain, TRINO symptoms, melena, hematochezia, anorexia. Psychiatric: No agitation, confusion/disorientation, suicidal or homicidal ideation. Musculoskeletal: no other significant joint swelling, instability, focal weakness. Endocrine: Chronic obesity, otherwise no complaints. Neurological: Mild static cerebral palsy. No seizures, numbness, dizziness, speech abnormality, incontinence. Social History     Tobacco Use    Smoking status: Never Smoker    Smokeless tobacco: Never Used   Substance Use Topics    Alcohol use: No     Alcohol/week: 0.0 standard drinks    Drug use: No     Family History   Problem Relation Age of Onset    Heart Disease Mother     Diabetes Father     Hypertension Father     Cancer Father         asbestosis    Asthma Sister      OBJECTIVE  Constitutional: General Appearance:  well developed, obese, nontoxic, in no acute distress. Visit Vitals  /74 (BP 1 Location: Left arm, BP Patient Position: Sitting)   Pulse 76   Temp 96.1 °F (35.6 °C) (Oral)   Resp 18   Ht 5' 7\" (1.702 m)   Wt 252 lb (114.3 kg)   SpO2 96%   BMI 39.47 kg/m²     Skin: No acute rash.     Cardiovascular[de-identified] Palpation of Heart: PMI not displaced or enlarged, no thrills, no heaves. Auscultation of Heart: RRR; No murmur, no rubs, no gallops. Neck: carotid arteries- normal volume & without bruit; no JVD. Extremities: no edema, no active varicosity. Pulmonary[de-identified] Respiratory effort: normal; no dyspnea, no retractions, no accessory muscle use. Auscultation: normal & symmetrical air exchange; no rales, no rhonchi, no wheeze; no rubs. GI[de-identified] Normal bowel sounds. No masses; no tenderness; no rebound/rigidity; no CVA tenderness. No hepatosplenomegaly. Psychiatric[de-identified] Oriented to time, place and person. Normal mood, no agitation or anxiety. Normal affect. Pleasant and cooperative. Musculoskeletal:  Gait and station: Normal  NC/AT. Neck-supple. Extremities: Rt calf- tender swelling lateral side. Pain in contraction. Rt Knee/ankle- otherwise normal.  No heat, effusion, redness, tenderness. Normal ROM. No instability. Normal strength & tone. Neurological[de-identified] CN I to XII: intact. DTR: symmetrical & normal. SLR- neg.     Lab Results   Component Value Date/Time    Cholesterol, total 211 (H) 07/11/2019 09:14 AM    HDL Cholesterol 63 (H) 07/11/2019 09:14 AM    LDL, calculated 126.6 (H) 07/11/2019 09:14 AM    VLDL, calculated 21.4 07/11/2019 09:14 AM    Triglyceride 107 07/11/2019 09:14 AM    CHOL/HDL Ratio 3.3 07/11/2019 09:14 AM     Lab Results   Component Value Date/Time    WBC 6.1 07/11/2019 09:14 AM    HGB 14.3 07/11/2019 09:14 AM    HCT 43.6 07/11/2019 09:14 AM    PLATELET 240 03/16/1474 09:14 AM    MCV 94.6 07/11/2019 09:14 AM     Lab Results   Component Value Date/Time    Sodium 138 07/11/2019 09:14 AM    Potassium 3.6 07/11/2019 09:14 AM    Chloride 99 (L) 07/11/2019 09:14 AM    CO2 31 07/11/2019 09:14 AM    Anion gap 8 07/11/2019 09:14 AM    Glucose 82 07/11/2019 09:14 AM    BUN 11 07/11/2019 09:14 AM    Creatinine 0.60 07/11/2019 09:14 AM    BUN/Creatinine ratio 18 07/11/2019 09:14 AM    GFR est AA >60 07/11/2019 09:14 AM    GFR est non-AA >60 07/11/2019 09:14 AM    Calcium 9.4 07/11/2019 09:14 AM    Bilirubin, total 0.5 07/11/2019 09:14 AM    AST (SGOT) 23 07/11/2019 09:14 AM    Alk. phosphatase 159 (H) 07/11/2019 09:14 AM    Protein, total 7.3 07/11/2019 09:14 AM    Albumin 4.0 07/11/2019 09:14 AM    Globulin 3.3 07/11/2019 09:14 AM    A-G Ratio 1.2 07/11/2019 09:14 AM    ALT (SGPT) 36 07/11/2019 09:14 AM     Lab Results   Component Value Date/Time    TSH 2.19 07/11/2019 09:14 AM       ASSESSMENT    1. Pain of right lower extremity    2. Swelling of calf    3. Moderate persistent asthma without complication    R/O DVT. Asthma is controlled. PLAN    Orders Placed This Encounter    montelukast (SINGULAIR) 10 mg tablet   STAT PVL Venous RLE. Pharmacologic Management: Medications reviewed with the patient. No change today. Discussed risk/benefit & side effect of treatment. Discussed DDx, follow-up & work-up. If negative for DVT, will treat as muscle strain. Asthma/ allergy reviewed. Follow up visit as planned, prn sooner. PRN to ED. Health risk from non adherence discussed. Patent voiced understanding. Follow-up and Dispositions    · Return in about 2 weeks (around 10/23/2019).                               Zenia Flores MD

## 2019-10-09 NOTE — PROGRESS NOTES
Kamila Lopez is a 49 y.o. female presents in office for a follow up for right leg pain. Medication list has been reviewed with patient during office visit today. Health maintenance with due date reviewed with patient. Health Maintenance Due   Topic Date Due    Pneumococcal 0-64 years (1 of 1 - PPSV23) 09[birth year]01/1972    DTaP/Tdap/Td series (1 - Tdap) 09/01/1987       1. Have you been to the ER, urgent care clinic since your last visit? Hospitalized since your last visit? No    2. Have you seen or consulted any other health care providers outside of the [address] [place] [address] since your last visit? Include any pap smears or colon screening.  No

## 2020-01-27 DIAGNOSIS — J45.40 MODERATE PERSISTENT ASTHMA WITHOUT COMPLICATION: ICD-10-CM

## 2020-01-27 RX ORDER — ALBUTEROL SULFATE 90 UG/1
2 AEROSOL, METERED RESPIRATORY (INHALATION)
Qty: 1 INHALER | Refills: 0 | Status: SHIPPED | OUTPATIENT
Start: 2020-01-27

## 2020-02-10 ENCOUNTER — OFFICE VISIT (OUTPATIENT)
Dept: FAMILY MEDICINE CLINIC | Facility: CLINIC | Age: 55
End: 2020-02-10

## 2020-02-10 VITALS
SYSTOLIC BLOOD PRESSURE: 135 MMHG | DIASTOLIC BLOOD PRESSURE: 84 MMHG | OXYGEN SATURATION: 98 % | HEART RATE: 94 BPM | RESPIRATION RATE: 16 BRPM | WEIGHT: 252.4 LBS | HEIGHT: 67 IN | TEMPERATURE: 97 F | BODY MASS INDEX: 39.62 KG/M2

## 2020-02-10 DIAGNOSIS — E78.5 HYPERLIPIDEMIA, UNSPECIFIED HYPERLIPIDEMIA TYPE: ICD-10-CM

## 2020-02-10 DIAGNOSIS — J45.41 MODERATE PERSISTENT ASTHMA WITH ACUTE EXACERBATION: Primary | ICD-10-CM

## 2020-02-10 DIAGNOSIS — I10 ESSENTIAL HYPERTENSION: ICD-10-CM

## 2020-02-10 DIAGNOSIS — R74.8 ELEVATED ALKALINE PHOSPHATASE LEVEL: ICD-10-CM

## 2020-02-10 RX ORDER — FLUTICASONE PROPIONATE AND SALMETEROL 500; 50 UG/1; UG/1
1 POWDER RESPIRATORY (INHALATION) EVERY 12 HOURS
Qty: 3 INHALER | Refills: 1 | Status: SHIPPED | OUTPATIENT
Start: 2020-02-10

## 2020-02-10 RX ORDER — BENZONATATE 200 MG/1
200 CAPSULE ORAL
Qty: 30 CAP | Refills: 0 | Status: SHIPPED | OUTPATIENT
Start: 2020-02-10 | End: 2020-03-26

## 2020-02-10 NOTE — PROGRESS NOTES
Chief Complaint   Patient presents with    Cough    Asthma    Hypertension     1. Have you been to the ER, urgent care clinic since your last visit? Hospitalized since your last visit? No    2. Have you seen or consulted any other health care providers outside of the [address] [place] [address] since your last visit? Include any pap smears or colon screening.  No

## 2020-02-10 NOTE — PROGRESS NOTES
SUBJECTIVE  Chief Complaint   Patient presents with    Asthma     cough    Hypertension     HPI:     She had an URI with nasal congestion and malaise about a week ago. She has recovered from that. However she continues to have nonproductive cough which keeping her up at night. Prior to that her asthma/ allergy was controlled with Advair and Flonase. She has run out of Advair about a month ago. She is doing well with her asthma as long as not exposed to extreme heat, fumes and dust, or she does not develop respiratory infection. Her BP is controlled with amlodipine and Dyazide. She denies any side effect. She is following life style as tolerated for HTN,  lipids or obesity. Her back pain is stable  She takes Tylenol PRN. No other Systemic, Cardiovascular or Respiratory symptoms. Past Medical History:   Diagnosis Date    Asthma     Cerebral palsy (HCC)     Cervical strain     GERD (gastroesophageal reflux disease)     Hyperlipidemia     Hypertension     Ovarian cyst     Sinusitis     with cough     Past Surgical History:   Procedure Laterality Date    HX CHOLECYSTECTOMY      HX HYSTERECTOMY      HX TUBAL LIGATION Bilateral         Current Outpatient Medications   Medication Sig    albuterol (PROVENTIL HFA, VENTOLIN HFA, PROAIR HFA) 90 mcg/actuation inhaler Take 2 Puffs by inhalation every four (4) hours as needed for Wheezing.  montelukast (SINGULAIR) 10 mg tablet TAKE ONE TABLET BY MOUTH ONCE DAILY    triamterene-hydroCHLOROthiazide (DYAZIDE) 37.5-25 mg per capsule TAKE ONE CAPSULE BY MOUTH ONCE DAILY    amLODIPine (NORVASC) 5 mg tablet Take 1 Tab by mouth daily.  fluticasone (FLONASE) 50 mcg/actuation nasal spray 2 Sprays by Both Nostrils route daily.  fluticasone-salmeterol (ADVAIR) 500-50 mcg/dose diskus inhaler Take 1 Puff by inhalation every twelve (12) hours.  Nebulizer & Compressor machine Use as directed.     albuterol-ipratropium (DUO-NEB) 2.5 mg-0.5 mg/3 ml nebu 3 mL by Nebulization route four (4) times daily as needed.  multivitamin (ONE A DAY) tablet Take 1 Tab by mouth daily.  guaiFENesin (MUCINEX) 1,200 mg Ta12 ER tablet Take 1 Tab by mouth two (2) times a day. (Patient taking differently: Take 1,200 mg by mouth daily as needed.)    Omega-3-DHA-EPA-Fish Oil 1,000 mg (120 mg-180 mg) cap Take  by mouth. No current facility-administered medications for this visit. Allergies   Allergen Reactions    Latex Hives    Other Plant, Animal, Environmental Anaphylaxis     Wasp Sting    Prednisone Unable to Obtain     ROS:   Constitutional: Now no fever, chills, night sweats, dizziness. Ear/Nose/Throat: No ear/ throat/ sinus pain, lesions, unusual discharge, new speaking or hearing problems, nose bleed. Cardiovascular: No angina, palpitations, PND, orthopnea, lightheadedness, edema, claudication. Respiratory: Nonproductive cough as above. No pleurisy, hemoptysis, unusual sputum. Gastrointestinal: No nausea/ vomiting, bowel habit change, pain, TRINO symptoms, melena, hematochezia, anorexia. Psychiatric: No agitation, confusion/disorientation, suicidal or homicidal ideation. Musculoskeletal: no significant joint swelling, instability, focal weakness. Endocrine: Chronic obesity, otherwise no complaints. Allergy: Controlled asthma/chronic allergic rhinitis. Neurological: Mild static cerebral palsy. No seizures, numbness, dizziness, speech abnormality, incontinence. Social History     Tobacco Use    Smoking status: Never Smoker    Smokeless tobacco: Never Used   Substance Use Topics    Alcohol use: No     Alcohol/week: 0.0 standard drinks    Drug use: No     Family History   Problem Relation Age of Onset    Heart Disease Mother     Diabetes Father     Hypertension Father     Cancer Father         asbestosis    Asthma Sister      OBJECTIVE  Constitutional: General Appearance:  well developed, obese, nontoxic, in no acute distress. Visit Vitals  /84 (BP 1 Location: Left arm, BP Patient Position: Sitting)   Pulse 94   Temp 97 °F (36.1 °C) (Oral)   Resp 16   Ht 5' 7\" (1.702 m)   Wt 252 lb 6.4 oz (114.5 kg)   SpO2 98%   BMI 39.53 kg/m²     Otoscopic Examination: external auditory canals are clear; tympanic membranes are dull. Nasal Cavity: mildly swollen mucosa & turbinates. Maxillas are not tender w/o redness or heat. Throat: clear tonsils, oropharynx, posterior pharynx. Skin: No acute rash. Cardiovascular[de-identified] Palpation of Heart: PMI not displaced or enlarged, no thrills, no heaves. Auscultation of Heart: RRR; No murmur, no rubs, no gallops. Neck: carotid arteries- normal volume & without bruit; no JVD. Extremities: no edema, no active varicosity. GI[de-identified] Normal bowel sounds. No masses; no tenderness; no rebound/rigidity; no CVA tenderness. No hepatosplenomegaly. Pulmonary[de-identified] Respiratory effort: normal; no dyspnea, no retractions, no accessory muscle use. Auscultation: normal & symmetrical air exchange; no rales, no rhonchi, no wheeze; no rubs    Psychiatric[de-identified] Oriented to time, place and person. Normal mood, no agitation or anxiety. Normal affect. Pleasant and cooperative. Musculoskeletal:  Gait and station: Normal  NC/AT. Neck-supple.       Lab Results   Component Value Date/Time    Cholesterol, total 211 (H) 07/11/2019 09:14 AM    HDL Cholesterol 63 (H) 07/11/2019 09:14 AM    LDL, calculated 126.6 (H) 07/11/2019 09:14 AM    VLDL, calculated 21.4 07/11/2019 09:14 AM    Triglyceride 107 07/11/2019 09:14 AM    CHOL/HDL Ratio 3.3 07/11/2019 09:14 AM     Lab Results   Component Value Date/Time    WBC 6.1 07/11/2019 09:14 AM    HGB 14.3 07/11/2019 09:14 AM    HCT 43.6 07/11/2019 09:14 AM    PLATELET 131 55/76/2749 09:14 AM    MCV 94.6 07/11/2019 09:14 AM     Lab Results   Component Value Date/Time    Sodium 138 07/11/2019 09:14 AM    Potassium 3.6 07/11/2019 09:14 AM    Chloride 99 (L) 07/11/2019 09:14 AM    CO2 31 07/11/2019 09:14 AM    Anion gap 8 07/11/2019 09:14 AM    Glucose 82 07/11/2019 09:14 AM    BUN 11 07/11/2019 09:14 AM    Creatinine 0.60 07/11/2019 09:14 AM    BUN/Creatinine ratio 18 07/11/2019 09:14 AM    GFR est AA >60 07/11/2019 09:14 AM    GFR est non-AA >60 07/11/2019 09:14 AM    Calcium 9.4 07/11/2019 09:14 AM    Bilirubin, total 0.5 07/11/2019 09:14 AM    AST (SGOT) 23 07/11/2019 09:14 AM    Alk. phosphatase 159 (H) 07/11/2019 09:14 AM    Protein, total 7.3 07/11/2019 09:14 AM    Albumin 4.0 07/11/2019 09:14 AM    Globulin 3.3 07/11/2019 09:14 AM    A-G Ratio 1.2 07/11/2019 09:14 AM    ALT (SGPT) 36 07/11/2019 09:14 AM     Lab Results   Component Value Date/Time    TSH 2.19 07/11/2019 09:14 AM       ASSESSMENT    1. Moderate persistent asthma with acute exacerbation    2. Essential hypertension    3. Hyperlipidemia, unspecified hyperlipidemia type    4. Elevated alkaline phosphatase level        PLAN    Discussed the patient's BMI with her. The BMI follow up plan is as follows:   -dietary management education, guidance, and counseling  -encourage exercise  -monitor weight prescribed dietary intake    Orders Placed This Encounter    HEPATIC FUNCTION PANEL    fluticasone propion-salmeteroL (ADVAIR/WIXELA) 500-50 mcg/dose diskus inhaler    benzonatate (TESSALON) 200 mg capsule   She will go to the lab and call for results. Pharmacologic Management: Medications reviewed with the patient. Go back on Advair q12h. Tessalon PRN. Plan of care (Diet and Exercise) discussed in details. Discussed risk/benefit & side effect of treatment. Discussed DDx, follow-up & work-up. Asthma/ allergy reviewed again. Low salt ADA diet, weightloss & graduated excecise. Discussed nutrition, education, accident prevention and anticipatory guidance. Follow up visit as planned, prn sooner. Health risk from non adherence discussed. Patent voiced understanding.         Follow-up and Dispositions    · Return in about 3 months (around 5/10/2020).                                  Roya Pickard MD

## 2020-03-26 ENCOUNTER — VIRTUAL VISIT (OUTPATIENT)
Dept: FAMILY MEDICINE CLINIC | Facility: CLINIC | Age: 55
End: 2020-03-26

## 2020-03-26 DIAGNOSIS — J45.40 MODERATE PERSISTENT ASTHMA WITHOUT COMPLICATION: ICD-10-CM

## 2020-03-26 DIAGNOSIS — J45.41 MODERATE PERSISTENT ASTHMA WITH ACUTE EXACERBATION: Primary | ICD-10-CM

## 2020-03-26 DIAGNOSIS — J01.00 ACUTE NON-RECURRENT MAXILLARY SINUSITIS: ICD-10-CM

## 2020-03-26 DIAGNOSIS — J30.9 CHRONIC ALLERGIC RHINITIS: ICD-10-CM

## 2020-03-26 RX ORDER — CEFDINIR 300 MG/1
300 CAPSULE ORAL 2 TIMES DAILY
Qty: 20 CAP | Refills: 0 | Status: SHIPPED | OUTPATIENT
Start: 2020-03-26 | End: 2020-04-05

## 2020-03-26 RX ORDER — ALBUTEROL SULFATE 90 UG/1
2 AEROSOL, METERED RESPIRATORY (INHALATION)
Qty: 1 INHALER | Refills: 2 | Status: SHIPPED | OUTPATIENT
Start: 2020-03-26

## 2020-03-26 RX ORDER — LEVOCETIRIZINE DIHYDROCHLORIDE 5 MG/1
5 TABLET, FILM COATED ORAL DAILY
Qty: 30 TAB | Refills: 5 | Status: SHIPPED | OUTPATIENT
Start: 2020-03-26

## 2020-03-26 NOTE — PROGRESS NOTES
HISTORY OF PRESENT ILLNESS  Debbie Candelario is a 49 y.o. female. HPI   Pt seen via doxy. me and is c/o cough x 1 week assoc with malaise, sinus pressure, nasal congestion, and ST on left side. Denies sick contacts, fever, chills, wheezing, SOB, HA, dizziness, otalgia, rash, and N/V/D. She also has seasonal allergies and does not feel her meds are working as well. Will try changing from zyrtec to xyzal but pt should continue to take singulair and flonase., She does need a refill on albuterol. Pt instructed to go to ER if worsening. Allergies   Allergen Reactions    Latex Hives    Other Plant, Animal, Environmental Anaphylaxis     Wasp Sting    Prednisone Unable to Obtain       Current Outpatient Medications   Medication Sig    levocetirizine (XYZAL) 5 mg tablet Take 1 Tab by mouth daily.  albuterol (Ventolin HFA) 90 mcg/actuation inhaler Take 2 Puffs by inhalation every four (4) hours as needed for Wheezing.  cefdinir (OMNICEF) 300 mg capsule Take 1 Cap by mouth two (2) times a day for 10 days.  fluticasone propion-salmeteroL (ADVAIR/WIXELA) 500-50 mcg/dose diskus inhaler Take 1 Puff by inhalation every twelve (12) hours.  albuterol (PROVENTIL HFA, VENTOLIN HFA, PROAIR HFA) 90 mcg/actuation inhaler Take 2 Puffs by inhalation every four (4) hours as needed for Wheezing.  montelukast (SINGULAIR) 10 mg tablet TAKE ONE TABLET BY MOUTH ONCE DAILY    triamterene-hydroCHLOROthiazide (DYAZIDE) 37.5-25 mg per capsule TAKE ONE CAPSULE BY MOUTH ONCE DAILY    amLODIPine (NORVASC) 5 mg tablet Take 1 Tab by mouth daily.  fluticasone (FLONASE) 50 mcg/actuation nasal spray 2 Sprays by Both Nostrils route daily.  Nebulizer & Compressor machine Use as directed.  albuterol-ipratropium (DUO-NEB) 2.5 mg-0.5 mg/3 ml nebu 3 mL by Nebulization route four (4) times daily as needed.  multivitamin (ONE A DAY) tablet Take 1 Tab by mouth daily.     Omega-3-DHA-EPA-Fish Oil 1,000 mg (120 mg-180 mg) cap Take by mouth. No current facility-administered medications for this visit. Review of Systems   Constitutional: Positive for malaise/fatigue. Negative for chills and fever. HENT: Positive for congestion and sinus pain. Negative for ear pain, sore throat and tinnitus. Respiratory: Positive for cough and sputum production (occassionally ). Negative for hemoptysis, shortness of breath and wheezing. Cardiovascular: Negative. Negative for chest pain, palpitations and leg swelling. Gastrointestinal: Negative. Negative for abdominal pain, nausea and vomiting. Genitourinary: Negative. Negative for dysuria, frequency, hematuria and urgency. Musculoskeletal: Negative for myalgias. Skin: Negative. Negative for itching and rash. Neurological: Negative. Negative for dizziness, tingling and headaches. Psychiatric/Behavioral: Negative. Negative for depression. The patient is not nervous/anxious and does not have insomnia. Physical Exam  Constitutional:       Appearance: Normal appearance. HENT:      Head: Normocephalic and atraumatic. Neurological:      Mental Status: She is alert and oriented to person, place, and time. Psychiatric:         Mood and Affect: Mood normal.         Behavior: Behavior normal.         ASSESSMENT and PLAN    ICD-10-CM ICD-9-CM    1. Moderate persistent asthma with acute exacerbation J45.41 493.92 levocetirizine (XYZAL) 5 mg tablet   2. Chronic allergic rhinitis J30.9 477.9 levocetirizine (XYZAL) 5 mg tablet   3. Moderate persistent asthma without complication D09.89 868.40 albuterol (Ventolin HFA) 90 mcg/actuation inhaler   4. Acute non-recurrent maxillary sinusitis J01.00 461.0 cefdinir (OMNICEF) 300 mg capsule     Follow-up and Dispositions    · Return if symptoms worsen or fail to improve. Pt expressed understanding of visit summary and plans for any follow ups or referrals as well as any medications prescribed.            Seen by synchronous (real-time) audio-video technology via doxy. me on 03/26/2020. .    The patient is aware that this patient-initiated Telehealth encounter is a billable service, with coverage as determined by his or her insurance carrier. He/She is aware that they may receive a bill and has provided verbal consent to proceed: Yes        I was in the office while conducting this encounter.

## 2020-03-26 NOTE — PATIENT INSTRUCTIONS
Sinusitis: Care Instructions Your Care Instructions Sinusitis is an infection of the lining of the sinus cavities in your head. Sinusitis often follows a cold. It causes pain and pressure in your head and face. In most cases, sinusitis gets better on its own in 1 to 2 weeks. But some mild symptoms may last for several weeks. Sometimes antibiotics are needed. Follow-up care is a key part of your treatment and safety. Be sure to make and go to all appointments, and call your doctor if you are having problems. It's also a good idea to know your test results and keep a list of the medicines you take. How can you care for yourself at home? · Take an over-the-counter pain medicine, such as acetaminophen (Tylenol), ibuprofen (Advil, Motrin), or naproxen (Aleve). Read and follow all instructions on the label. · If the doctor prescribed antibiotics, take them as directed. Do not stop taking them just because you feel better. You need to take the full course of antibiotics. · Be careful when taking over-the-counter cold or flu medicines and Tylenol at the same time. Many of these medicines have acetaminophen, which is Tylenol. Read the labels to make sure that you are not taking more than the recommended dose. Too much acetaminophen (Tylenol) can be harmful. · Breathe warm, moist air from a steamy shower, a hot bath, or a sink filled with hot water. Avoid cold, dry air. Using a humidifier in your home may help. Follow the directions for cleaning the machine. · Use saline (saltwater) nasal washes to help keep your nasal passages open and wash out mucus and bacteria. You can buy saline nose drops at a grocery store or drugstore. Or you can make your own at home by adding 1 teaspoon of salt and 1 teaspoon of baking soda to 2 cups of distilled water. If you make your own, fill a bulb syringe with the solution, insert the tip into your nostril, and squeeze gently. Ruslan Garnern your nose. · Put a hot, wet towel or a warm gel pack on your face 3 or 4 times a day for 5 to 10 minutes each time. · Try a decongestant nasal spray like oxymetazoline (Afrin). Do not use it for more than 3 days in a row. Using it for more than 3 days can make your congestion worse. When should you call for help? Call your doctor now or seek immediate medical care if: 
  · You have new or worse swelling or redness in your face or around your eyes.  
  · You have a new or higher fever.  
 Watch closely for changes in your health, and be sure to contact your doctor if: 
  · You have new or worse facial pain.  
  · The mucus from your nose becomes thicker (like pus) or has new blood in it.  
  · You are not getting better as expected. Where can you learn more? Go to http://tavon-kareen.info/ Enter S924 in the search box to learn more about \"Sinusitis: Care Instructions. \" Current as of: July 28, 2019Content Version: 12.4 © 9410-1572 [employer]. Care instructions adapted under license by [employer] (which disclaims liability or warranty for this information). If you have questions about a medical condition or this instruction, always ask your healthcare professional. [address] any warranty or liability for your use of this information.

## 2020-07-09 NOTE — TELEPHONE ENCOUNTER
Requested Prescriptions     Pending Prescriptions Disp Refills    amLODIPine (NORVASC) 5 mg tablet 90 Tab 3     Sig: Take 1 Tab by mouth daily. No future appointments.

## 2020-07-10 RX ORDER — AMLODIPINE BESYLATE 5 MG/1
5 TABLET ORAL DAILY
Qty: 90 TAB | Refills: 3 | Status: SHIPPED | OUTPATIENT
Start: 2020-07-10 | End: 2020-07-21 | Stop reason: SDUPTHER

## 2020-07-21 ENCOUNTER — VIRTUAL VISIT (OUTPATIENT)
Dept: FAMILY MEDICINE CLINIC | Facility: CLINIC | Age: 55
End: 2020-07-21

## 2020-07-21 DIAGNOSIS — E78.5 HYPERLIPIDEMIA, UNSPECIFIED HYPERLIPIDEMIA TYPE: ICD-10-CM

## 2020-07-21 DIAGNOSIS — J45.40 MODERATE PERSISTENT ASTHMA WITHOUT COMPLICATION: Primary | ICD-10-CM

## 2020-07-21 DIAGNOSIS — I10 ESSENTIAL HYPERTENSION: ICD-10-CM

## 2020-07-21 DIAGNOSIS — Z00.00 ROUTINE GENERAL MEDICAL EXAMINATION AT A HEALTH CARE FACILITY: ICD-10-CM

## 2020-07-21 DIAGNOSIS — E66.01 SEVERE OBESITY (BMI >= 40) (HCC): ICD-10-CM

## 2020-07-21 RX ORDER — AMLODIPINE BESYLATE 5 MG/1
5 TABLET ORAL DAILY
Qty: 90 TAB | Refills: 3 | Status: SHIPPED | OUTPATIENT
Start: 2020-07-21

## 2020-07-21 NOTE — PROGRESS NOTES
Consent:   Denise Escobedo, who was seen by synchronous (real-time) audio-video technology, and/or her healthcare decision maker, is aware that this patient-initiated, Telehealth encounter on 7/21/2020 is a billable service, with coverage as determined by her insurance carrier. She is aware that she may receive a bill and has provided verbal consent to proceed: yes. SUBJECTIVE:    Chief Complaint   Patient presents with    Asthma    Hypertension    Cholesterol Problem     HPI:     Her asthma/ allergy is controlled with Advair and Flonase. She is doing well with her asthma as long as not exposed to extreme heat, fumes and dust, or she does not develop respiratory infection. Her BP is controlled with amlodipine and Dyazide. She denies any side effect. She is following life style as tolerated for HTN, lipids or obesity. Her back pain is stable  She takes Tylenol PRN. No other Systemic, Cardiovascular or Respiratory symptoms. Past Medical History:   Diagnosis Date    Asthma     Cerebral palsy (HCC)     Cervical strain     GERD (gastroesophageal reflux disease)     Hyperlipidemia     Hypertension     Ovarian cyst     Sinusitis     with cough     Past Surgical History:   Procedure Laterality Date    HX CHOLECYSTECTOMY      HX HYSTERECTOMY      HX TUBAL LIGATION Bilateral         Current Outpatient Medications   Medication Sig    amLODIPine (NORVASC) 5 mg tablet Take 1 Tab by mouth daily.  levocetirizine (XYZAL) 5 mg tablet Take 1 Tab by mouth daily.  albuterol (Ventolin HFA) 90 mcg/actuation inhaler Take 2 Puffs by inhalation every four (4) hours as needed for Wheezing.  fluticasone propion-salmeteroL (ADVAIR/WIXELA) 500-50 mcg/dose diskus inhaler Take 1 Puff by inhalation every twelve (12) hours.  albuterol (PROVENTIL HFA, VENTOLIN HFA, PROAIR HFA) 90 mcg/actuation inhaler Take 2 Puffs by inhalation every four (4) hours as needed for Wheezing.     montelukast (SINGULAIR) 10 mg tablet TAKE ONE TABLET BY MOUTH ONCE DAILY    triamterene-hydroCHLOROthiazide (DYAZIDE) 37.5-25 mg per capsule TAKE ONE CAPSULE BY MOUTH ONCE DAILY    fluticasone (FLONASE) 50 mcg/actuation nasal spray 2 Sprays by Both Nostrils route daily.  Nebulizer & Compressor machine Use as directed.  albuterol-ipratropium (DUO-NEB) 2.5 mg-0.5 mg/3 ml nebu 3 mL by Nebulization route four (4) times daily as needed.  multivitamin (ONE A DAY) tablet Take 1 Tab by mouth daily.  Omega-3-DHA-EPA-Fish Oil 1,000 mg (120 mg-180 mg) cap Take  by mouth. No current facility-administered medications for this visit. Allergies   Allergen Reactions    Latex Hives    Other Plant, Animal, Environmental Anaphylaxis     Wasp Sting    Prednisone Unable to Obtain     ROS:   Constitutional: Now no fever, chills, night sweats, dizziness. Ear/Nose/Throat: No ear/ throat/ sinus pain, lesions, unusual discharge, new speaking or hearing problems, nose bleed. Cardiovascular: No angina, palpitations, PND, orthopnea, lightheadedness, edema, claudication. Respiratory: Asthma is controlled. No pleurisy, hemoptysis, unusual sputum. Gastrointestinal: No nausea/ vomiting, bowel habit change, pain, TRINO symptoms, melena, hematochezia, anorexia. Psychiatric: No agitation, confusion/disorientation, suicidal or homicidal ideation. Musculoskeletal: no significant joint swelling, instability, focal weakness. Endocrine: Chronic obesity and hyperlipidemia, otherwise no complaints. Allergy: Controlled asthma/chronic allergic rhinitis. Neurological: Mild static cerebral palsy. No seizures, numbness, dizziness, speech abnormality, incontinence.     Social History     Tobacco Use    Smoking status: Never Smoker    Smokeless tobacco: Never Used   Substance Use Topics    Alcohol use: No     Alcohol/week: 0.0 standard drinks    Drug use: No     Family History   Problem Relation Age of Onset    Heart Disease Mother    [facility] hospitals Diabetes Father     Hypertension Father     Cancer Father         asbestosis    Asthma Sister      OBJECTIVE  Constitutional: General Appearance:  Appears well-developed and obese. Nontoxic, in no acute distress. Mental status:  Alert and awake. Oriented to person/place/time. Able to follow commands. Eyes:   Sclera  Normal.  HENT:  Normocephalic, atraumatic. No Facial Asymmetry. No gaze palsy    Pulmonary: Respiratory effort: normal; no dyspnea. Neurological:  Speech normal.      Psychiatric:  Pleasant and cooperative. Normal Affect. No Hallucinations. Musculoskeletal[de-identified]   Neck is supple. Lab Results   Component Value Date/Time    Cholesterol, total 211 (H) 07/11/2019 09:14 AM    HDL Cholesterol 63 (H) 07/11/2019 09:14 AM    LDL, calculated 126.6 (H) 07/11/2019 09:14 AM    VLDL, calculated 21.4 07/11/2019 09:14 AM    Triglyceride 107 07/11/2019 09:14 AM    CHOL/HDL Ratio 3.3 07/11/2019 09:14 AM     Lab Results   Component Value Date/Time    WBC 6.1 07/11/2019 09:14 AM    HGB 14.3 07/11/2019 09:14 AM    HCT 43.6 07/11/2019 09:14 AM    PLATELET 753 58/07/7612 09:14 AM    MCV 94.6 07/11/2019 09:14 AM     Lab Results   Component Value Date/Time    Sodium 138 07/11/2019 09:14 AM    Potassium 3.6 07/11/2019 09:14 AM    Chloride 99 (L) 07/11/2019 09:14 AM    CO2 31 07/11/2019 09:14 AM    Anion gap 8 07/11/2019 09:14 AM    Glucose 82 07/11/2019 09:14 AM    BUN 11 07/11/2019 09:14 AM    Creatinine 0.60 07/11/2019 09:14 AM    BUN/Creatinine ratio 18 07/11/2019 09:14 AM    GFR est AA >60 07/11/2019 09:14 AM    GFR est non-AA >60 07/11/2019 09:14 AM    Calcium 9.4 07/11/2019 09:14 AM    Bilirubin, total 0.5 07/11/2019 09:14 AM    Alk.  phosphatase 159 (H) 07/11/2019 09:14 AM    Protein, total 7.3 07/11/2019 09:14 AM    Albumin 4.0 07/11/2019 09:14 AM    Globulin 3.3 07/11/2019 09:14 AM    A-G Ratio 1.2 07/11/2019 09:14 AM    ALT (SGPT) 36 07/11/2019 09:14 AM     Lab Results   Component Value Date/Time TSH 2.19 07/11/2019 09:14 AM       ASSESSMENT    1. Moderate persistent asthma without complication    2. Essential hypertension    3. Severe obesity (BMI >= 40) ([facility])    4. Hyperlipidemia, unspecified hyperlipidemia type        PLAN    Discussed the patient's BMI with her. The BMI follow up plan is as follows:   -dietary management education, guidance, and counseling  -encourage exercise  -monitor weight prescribed dietary intake    Orders Placed This Encounter    CBC WITH AUTOMATED DIFF    URINALYSIS W/ RFLX MICROSCOPIC    HEMOGLOBIN A1C WITH EAG    LIPID PANEL    METABOLIC PANEL, COMPREHENSIVE    amLODIPine (NORVASC) 5 mg tablet   She will get this lab for her upcoming annual health maintenance visit. Pharmacologic Management: Medications reviewed with the patient. No change at this time. Plan of care (Diet and Exercise) discussed in details. Discussed risk/benefit & side effect of treatment. Discussed DDx, follow-up & work-up. Asthma/ allergy reviewed again. Low salt ADA diet, weightloss & graduated excecise. Discussed nutrition, education, accident prevention and anticipatory guidance. Follow up visit as planned, prn sooner. Health risk from non adherence discussed. Patent voiced understanding. Follow-up and Dispositions    · Return in about 3 months (around 10/21/2020). Kia Lara is a 49 y.o. female who was evaluated by a video visit encounter for concerns as above. A caregiver was present when appropriate. Due to this being a TeleHealth encounter (During [facility]- public health emergency), evaluation of the following organ systems was limited: Vitals/Constitutional/EENT/Resp/CV/GI//MS/Neuro/Skin/Heme-Lymph-Imm.   Pursuant to the emergency declaration under the [facility] waiver authority and the [employer] and [employer] Act, this Virtual  Visit was conducted, with patient's (and/or legal guardian's) consent, to reduce the patient's risk of exposure to COVID-19 and provide necessary medical care. Services were provided through a video synchronous discussion virtually to substitute for in-person clinic visit. Patient and provider were located at their individual homes.                             Valorie Swift MD

## 2020-07-24 DIAGNOSIS — I10 ESSENTIAL HYPERTENSION: ICD-10-CM

## 2020-07-24 RX ORDER — TRIAMTERENE AND HYDROCHLOROTHIAZIDE 37.5; 25 MG/1; MG/1
CAPSULE ORAL
Qty: 90 CAP | Refills: 0 | Status: SHIPPED | OUTPATIENT
Start: 2020-07-24 | End: 2020-10-27

## 2020-07-24 NOTE — TELEPHONE ENCOUNTER
Requested Prescriptions     Pending Prescriptions Disp Refills    triamterene-hydroCHLOROthiazide (DYAZIDE) 37.5-25 mg per capsule [Pharmacy Med Name: Triamterene-HCTZ 37.5-25 MG Oral Capsule] 90 Cap 0     Sig: TAKE 1 CAPSULE BY MOUTH ONCE DAILY *PT NEEDS TO MAKE A FOLLOW UP APPOINTMENT NOW*     No future appointments.

## 2022-03-19 PROBLEM — J01.01 ACUTE RECURRENT MAXILLARY SINUSITIS: Status: ACTIVE | Noted: 2017-06-15

## 2022-03-19 PROBLEM — E66.01 SEVERE OBESITY (BMI >= 40) (HCC): Status: ACTIVE | Noted: 2017-04-28

## 2023-05-29 RX ORDER — MONTELUKAST SODIUM 10 MG/1
1 TABLET ORAL DAILY
COMMUNITY
Start: 2019-10-09

## 2023-05-29 RX ORDER — LEVOCETIRIZINE DIHYDROCHLORIDE 5 MG/1
5 TABLET, FILM COATED ORAL DAILY
COMMUNITY
Start: 2020-03-26

## 2023-05-29 RX ORDER — IPRATROPIUM BROMIDE AND ALBUTEROL SULFATE 2.5; .5 MG/3ML; MG/3ML
3 SOLUTION RESPIRATORY (INHALATION) 4 TIMES DAILY PRN
COMMUNITY
Start: 2017-08-11

## 2023-05-29 RX ORDER — FLUTICASONE PROPIONATE 50 MCG
2 SPRAY, SUSPENSION (ML) NASAL DAILY
COMMUNITY
Start: 2018-11-13

## 2023-05-29 RX ORDER — AMLODIPINE BESYLATE 5 MG/1
5 TABLET ORAL DAILY
COMMUNITY
Start: 2020-07-21

## 2023-05-29 RX ORDER — TRIAMTERENE AND HYDROCHLOROTHIAZIDE 37.5; 25 MG/1; MG/1
1 CAPSULE ORAL DAILY
COMMUNITY
Start: 2020-11-27

## 2023-05-29 RX ORDER — ALBUTEROL SULFATE 90 UG/1
2 AEROSOL, METERED RESPIRATORY (INHALATION) EVERY 4 HOURS PRN
COMMUNITY
Start: 2020-01-27